# Patient Record
Sex: MALE | Race: WHITE | NOT HISPANIC OR LATINO
[De-identification: names, ages, dates, MRNs, and addresses within clinical notes are randomized per-mention and may not be internally consistent; named-entity substitution may affect disease eponyms.]

---

## 2017-01-05 ENCOUNTER — APPOINTMENT (OUTPATIENT)
Dept: NUCLEAR MEDICINE | Facility: IMAGING CENTER | Age: 77
End: 2017-01-05

## 2017-01-09 ENCOUNTER — APPOINTMENT (OUTPATIENT)
Dept: DERMATOLOGY | Facility: CLINIC | Age: 77
End: 2017-01-09

## 2017-01-24 ENCOUNTER — APPOINTMENT (OUTPATIENT)
Dept: DERMATOLOGY | Facility: CLINIC | Age: 77
End: 2017-01-24

## 2017-03-10 ENCOUNTER — EMERGENCY (EMERGENCY)
Facility: HOSPITAL | Age: 77
LOS: 1 days | Discharge: HOME | End: 2017-03-10
Attending: EMERGENCY MEDICINE
Payer: MEDICARE

## 2017-03-10 VITALS
WEIGHT: 182.1 LBS | TEMPERATURE: 98 F | HEIGHT: 66 IN | HEART RATE: 87 BPM | SYSTOLIC BLOOD PRESSURE: 128 MMHG | RESPIRATION RATE: 16 BRPM | OXYGEN SATURATION: 99 % | DIASTOLIC BLOOD PRESSURE: 77 MMHG

## 2017-03-10 DIAGNOSIS — Z98.890 OTHER SPECIFIED POSTPROCEDURAL STATES: Chronic | ICD-10-CM

## 2017-03-10 DIAGNOSIS — R42 DIZZINESS AND GIDDINESS: ICD-10-CM

## 2017-03-10 DIAGNOSIS — Z98.890 OTHER SPECIFIED POSTPROCEDURAL STATES: ICD-10-CM

## 2017-03-10 DIAGNOSIS — F32.9 MAJOR DEPRESSIVE DISORDER, SINGLE EPISODE, UNSPECIFIED: ICD-10-CM

## 2017-03-10 LAB
ALBUMIN SERPL ELPH-MCNC: 4.1 G/DL — SIGNIFICANT CHANGE UP (ref 3.3–5.2)
ALP SERPL-CCNC: 53 U/L — SIGNIFICANT CHANGE UP (ref 40–120)
ALT FLD-CCNC: 20 U/L — SIGNIFICANT CHANGE UP
ANION GAP SERPL CALC-SCNC: 10 MMOL/L — SIGNIFICANT CHANGE UP (ref 5–17)
AST SERPL-CCNC: 25 U/L — SIGNIFICANT CHANGE UP
BASOPHILS # BLD AUTO: 0 K/UL — SIGNIFICANT CHANGE UP (ref 0–0.2)
BASOPHILS NFR BLD AUTO: 0.2 % — SIGNIFICANT CHANGE UP (ref 0–2)
BILIRUB SERPL-MCNC: 0.7 MG/DL — SIGNIFICANT CHANGE UP (ref 0.4–2)
BUN SERPL-MCNC: 20 MG/DL — SIGNIFICANT CHANGE UP (ref 8–20)
CALCIUM SERPL-MCNC: 9.1 MG/DL — SIGNIFICANT CHANGE UP (ref 8.6–10.2)
CHLORIDE SERPL-SCNC: 100 MMOL/L — SIGNIFICANT CHANGE UP (ref 98–107)
CK MB CFR SERPL CALC: 3.4 NG/ML — SIGNIFICANT CHANGE UP (ref 0–6.7)
CK SERPL-CCNC: 171 U/L — SIGNIFICANT CHANGE UP (ref 30–200)
CO2 SERPL-SCNC: 30 MMOL/L — HIGH (ref 22–29)
CREAT SERPL-MCNC: 1.02 MG/DL — SIGNIFICANT CHANGE UP (ref 0.5–1.3)
EOSINOPHIL # BLD AUTO: 0 K/UL — SIGNIFICANT CHANGE UP (ref 0–0.5)
EOSINOPHIL NFR BLD AUTO: 0.9 % — SIGNIFICANT CHANGE UP (ref 0–6)
GLUCOSE SERPL-MCNC: 89 MG/DL — SIGNIFICANT CHANGE UP (ref 70–115)
HCT VFR BLD CALC: 41.5 % — LOW (ref 42–52)
HGB BLD-MCNC: 14.1 G/DL — SIGNIFICANT CHANGE UP (ref 14–18)
LYMPHOCYTES # BLD AUTO: 1.8 K/UL — SIGNIFICANT CHANGE UP (ref 1–4.8)
LYMPHOCYTES # BLD AUTO: 33.8 % — SIGNIFICANT CHANGE UP (ref 20–55)
MCHC RBC-ENTMCNC: 31.1 PG — HIGH (ref 27–31)
MCHC RBC-ENTMCNC: 34 G/DL — SIGNIFICANT CHANGE UP (ref 32–36)
MCV RBC AUTO: 91.6 FL — SIGNIFICANT CHANGE UP (ref 80–94)
MONOCYTES # BLD AUTO: 0.5 K/UL — SIGNIFICANT CHANGE UP (ref 0–0.8)
MONOCYTES NFR BLD AUTO: 8.7 % — SIGNIFICANT CHANGE UP (ref 3–10)
NEUTROPHILS # BLD AUTO: 3 K/UL — SIGNIFICANT CHANGE UP (ref 1.8–8)
NEUTROPHILS NFR BLD AUTO: 56.2 % — SIGNIFICANT CHANGE UP (ref 37–73)
PLATELET # BLD AUTO: 217 K/UL — SIGNIFICANT CHANGE UP (ref 150–400)
POTASSIUM SERPL-MCNC: 4.3 MMOL/L — SIGNIFICANT CHANGE UP (ref 3.5–5.3)
POTASSIUM SERPL-SCNC: 4.3 MMOL/L — SIGNIFICANT CHANGE UP (ref 3.5–5.3)
PROT SERPL-MCNC: 7 G/DL — SIGNIFICANT CHANGE UP (ref 6.6–8.7)
RBC # BLD: 4.53 M/UL — LOW (ref 4.6–6.2)
RBC # FLD: 13.1 % — SIGNIFICANT CHANGE UP (ref 11–15.6)
SODIUM SERPL-SCNC: 140 MMOL/L — SIGNIFICANT CHANGE UP (ref 135–145)
TROPONIN T SERPL-MCNC: <0.01 NG/ML — SIGNIFICANT CHANGE UP (ref 0–0.06)
WBC # BLD: 5.4 K/UL — SIGNIFICANT CHANGE UP (ref 4.8–10.8)
WBC # FLD AUTO: 5.4 K/UL — SIGNIFICANT CHANGE UP (ref 4.8–10.8)

## 2017-03-10 PROCEDURE — 93005 ELECTROCARDIOGRAM TRACING: CPT

## 2017-03-10 PROCEDURE — 70450 CT HEAD/BRAIN W/O DYE: CPT

## 2017-03-10 PROCEDURE — 99285 EMERGENCY DEPT VISIT HI MDM: CPT

## 2017-03-10 PROCEDURE — 99284 EMERGENCY DEPT VISIT MOD MDM: CPT | Mod: 25

## 2017-03-10 PROCEDURE — 93010 ELECTROCARDIOGRAM REPORT: CPT

## 2017-03-10 PROCEDURE — 84484 ASSAY OF TROPONIN QUANT: CPT

## 2017-03-10 PROCEDURE — 70450 CT HEAD/BRAIN W/O DYE: CPT | Mod: 26

## 2017-03-10 PROCEDURE — 82550 ASSAY OF CK (CPK): CPT

## 2017-03-10 PROCEDURE — 85027 COMPLETE CBC AUTOMATED: CPT

## 2017-03-10 PROCEDURE — 80053 COMPREHEN METABOLIC PANEL: CPT

## 2017-03-10 PROCEDURE — 82553 CREATINE MB FRACTION: CPT

## 2017-03-10 RX ORDER — METOCLOPRAMIDE HCL 10 MG
10 TABLET ORAL ONCE
Qty: 0 | Refills: 0 | Status: DISCONTINUED | OUTPATIENT
Start: 2017-03-10 | End: 2017-03-10

## 2017-03-10 RX ORDER — MECLIZINE HCL 12.5 MG
25 TABLET ORAL ONCE
Qty: 0 | Refills: 0 | Status: DISCONTINUED | OUTPATIENT
Start: 2017-03-10 | End: 2017-03-10

## 2017-03-10 RX ORDER — SODIUM CHLORIDE 9 MG/ML
500 INJECTION INTRAMUSCULAR; INTRAVENOUS; SUBCUTANEOUS ONCE
Qty: 0 | Refills: 0 | Status: COMPLETED | OUTPATIENT
Start: 2017-03-10 | End: 2017-03-10

## 2017-03-10 RX ADMIN — SODIUM CHLORIDE 500 MILLILITER(S): 9 INJECTION INTRAMUSCULAR; INTRAVENOUS; SUBCUTANEOUS at 13:25

## 2017-03-10 NOTE — ED ADULT NURSE NOTE - OBJECTIVE STATEMENT
pt states that he was light headed this am. pt denies chest pain no n/v. pt had CO checked in his house no CO detected.

## 2017-03-10 NOTE — ED PROVIDER NOTE - NEUROLOGICAL, MLM
Alert and oriented, no focal deficits, no motor or sensory deficits. Normal Finger- nose/ heel shin. No drift. CN II-XII intact.

## 2017-03-10 NOTE — ED PROVIDER NOTE - OBJECTIVE STATEMENT
76M  with h/o depression, BPH pw several weeks of lightheadedness mostly when patient is at home. Pt today became concerned ; called the [employer] company/ fire dept to check for CO and they came and cleared his home.  He denies palpitations/ dizziness/difficulty walking/ imbalance/ chest pain/ SOB.  States he had a full cardiac evaluation including echo and 24h Holter w/ Dr. Banuelos 2 weeks ago for similar symptoms.  States that he thinks he is depressed 2/2 his wife passing away 2 years ago in march and is lonely when he is at home alone.  States he is taking some medicine for depression and that it isn't severe, but it's baseline underlying. he states he attended grief counseling and has been meeting with the group of women who he met there for companionship , however 2 of them are interested in him and call him constantly but he is not interested. He states he feels a lot of pressure to maintain the relationship but thinks that it is causing him stress and he would like to cut it off.  He has a desire to travel the world and travel to see friends and believes that would do him good. Denies HI/ SI States he didn't really want to come to the hospital but 911 insisted and brought him here for evaluation. Pt adds that he exercises at least 2 hours daily and never feels SOB/ dizzy when he exercises. Eating and drinking well but not sleeping well at night

## 2017-03-10 NOTE — ED PROVIDER NOTE - MEDICAL DECISION MAKING DETAILS
76yoM pw mild lightheadedness he notices mostly when at home by himself in the setting of feeling depressed. Neuro intact and recent cardiac evaluation - will do screening blood work/ repeat EKG,/ screening CTh and likely rcommend f/u with him PMD/ psychiatrist.

## 2017-03-10 NOTE — ED PROVIDER NOTE - PROGRESS NOTE DETAILS
I reassessed patient. He is well appearing. Has been ambulatory around ED without ataxia/ assistance. I gave him a copy of his results and recommended that he call his Drs. for f/u which he states he will. Gave verbal discharge instructions and I removed patient IV.

## 2017-04-20 ENCOUNTER — APPOINTMENT (OUTPATIENT)
Dept: GASTROENTEROLOGY | Facility: CLINIC | Age: 77
End: 2017-04-20

## 2017-04-20 VITALS
DIASTOLIC BLOOD PRESSURE: 73 MMHG | WEIGHT: 156 LBS | SYSTOLIC BLOOD PRESSURE: 127 MMHG | RESPIRATION RATE: 12 BRPM | HEART RATE: 52 BPM | BODY MASS INDEX: 23.64 KG/M2 | HEIGHT: 68 IN

## 2017-05-22 ENCOUNTER — OUTPATIENT (OUTPATIENT)
Dept: OUTPATIENT SERVICES | Facility: HOSPITAL | Age: 77
LOS: 1 days | End: 2017-05-22
Payer: MEDICARE

## 2017-05-22 ENCOUNTER — APPOINTMENT (OUTPATIENT)
Dept: GASTROENTEROLOGY | Facility: GI CENTER | Age: 77
End: 2017-05-22

## 2017-05-22 DIAGNOSIS — Z12.11 ENCOUNTER FOR SCREENING FOR MALIGNANT NEOPLASM OF COLON: ICD-10-CM

## 2017-05-22 DIAGNOSIS — Z98.890 OTHER SPECIFIED POSTPROCEDURAL STATES: Chronic | ICD-10-CM

## 2017-05-22 PROCEDURE — G0105: CPT

## 2017-07-27 ENCOUNTER — APPOINTMENT (OUTPATIENT)
Dept: DERMATOLOGY | Facility: CLINIC | Age: 77
End: 2017-07-27
Payer: MEDICARE

## 2017-07-27 PROCEDURE — 99214 OFFICE O/P EST MOD 30 MIN: CPT

## 2017-08-31 ENCOUNTER — APPOINTMENT (OUTPATIENT)
Dept: DERMATOLOGY | Facility: CLINIC | Age: 77
End: 2017-08-31
Payer: MEDICARE

## 2017-08-31 PROCEDURE — 17003 DESTRUCT PREMALG LES 2-14: CPT

## 2017-08-31 PROCEDURE — 99213 OFFICE O/P EST LOW 20 MIN: CPT | Mod: 25

## 2017-08-31 PROCEDURE — 17000 DESTRUCT PREMALG LESION: CPT

## 2017-10-03 ENCOUNTER — APPOINTMENT (OUTPATIENT)
Dept: DERMATOLOGY | Facility: CLINIC | Age: 77
End: 2017-10-03
Payer: MEDICARE

## 2017-10-03 PROCEDURE — 99212 OFFICE O/P EST SF 10 MIN: CPT

## 2017-12-07 ENCOUNTER — APPOINTMENT (OUTPATIENT)
Dept: NEUROLOGY | Facility: CLINIC | Age: 77
End: 2017-12-07
Payer: MEDICARE

## 2017-12-07 VITALS
HEART RATE: 52 BPM | WEIGHT: 152 LBS | DIASTOLIC BLOOD PRESSURE: 67 MMHG | BODY MASS INDEX: 23.04 KG/M2 | HEIGHT: 68 IN | SYSTOLIC BLOOD PRESSURE: 116 MMHG

## 2017-12-07 DIAGNOSIS — G31.84 MILD COGNITIVE IMPAIRMENT, SO STATED: ICD-10-CM

## 2017-12-07 PROCEDURE — 99214 OFFICE O/P EST MOD 30 MIN: CPT

## 2017-12-07 RX ORDER — SODIUM SULFATE, POTASSIUM SULFATE, MAGNESIUM SULFATE 17.5; 3.13; 1.6 G/ML; G/ML; G/ML
17.5-3.13-1.6 SOLUTION, CONCENTRATE ORAL
Qty: 2 | Refills: 0 | Status: DISCONTINUED | COMMUNITY
Start: 2017-04-20 | End: 2017-12-07

## 2017-12-07 RX ORDER — TAMSULOSIN HYDROCHLORIDE 0.4 MG/1
0.4 CAPSULE ORAL
Refills: 0 | Status: DISCONTINUED | COMMUNITY
End: 2017-12-07

## 2018-01-22 ENCOUNTER — APPOINTMENT (OUTPATIENT)
Dept: DERMATOLOGY | Facility: CLINIC | Age: 78
End: 2018-01-22
Payer: MEDICARE

## 2018-01-22 ENCOUNTER — APPOINTMENT (OUTPATIENT)
Dept: DERMATOLOGY | Facility: CLINIC | Age: 78
End: 2018-01-22

## 2018-01-22 PROCEDURE — 99213 OFFICE O/P EST LOW 20 MIN: CPT | Mod: 25

## 2018-01-22 PROCEDURE — 17110 DESTRUCTION B9 LES UP TO 14: CPT

## 2018-02-05 ENCOUNTER — APPOINTMENT (OUTPATIENT)
Dept: DERMATOLOGY | Facility: CLINIC | Age: 78
End: 2018-02-05

## 2018-03-01 ENCOUNTER — APPOINTMENT (OUTPATIENT)
Dept: DERMATOLOGY | Facility: CLINIC | Age: 78
End: 2018-03-01

## 2018-04-12 ENCOUNTER — APPOINTMENT (OUTPATIENT)
Dept: NEUROLOGY | Facility: CLINIC | Age: 78
End: 2018-04-12
Payer: MEDICARE

## 2018-04-12 VITALS
DIASTOLIC BLOOD PRESSURE: 69 MMHG | WEIGHT: 157 LBS | SYSTOLIC BLOOD PRESSURE: 116 MMHG | BODY MASS INDEX: 23.79 KG/M2 | HEART RATE: 54 BPM | HEIGHT: 68 IN

## 2018-04-12 DIAGNOSIS — F02.80 ALZHEIMER'S DISEASE WITH LATE ONSET: ICD-10-CM

## 2018-04-12 DIAGNOSIS — R32 UNSPECIFIED URINARY INCONTINENCE: ICD-10-CM

## 2018-04-12 DIAGNOSIS — N52.9 MALE ERECTILE DYSFUNCTION, UNSPECIFIED: ICD-10-CM

## 2018-04-12 DIAGNOSIS — R35.0 FREQUENCY OF MICTURITION: ICD-10-CM

## 2018-04-12 DIAGNOSIS — G30.1 ALZHEIMER'S DISEASE WITH LATE ONSET: ICD-10-CM

## 2018-04-12 PROCEDURE — 99214 OFFICE O/P EST MOD 30 MIN: CPT

## 2018-04-12 RX ORDER — RIVASTIGMINE 4.6 MG/24H
4.6 PATCH, EXTENDED RELEASE TRANSDERMAL
Qty: 30 | Refills: 0 | Status: COMPLETED | COMMUNITY
Start: 2017-12-07 | End: 2018-01-06

## 2018-05-22 ENCOUNTER — MEDICATION RENEWAL (OUTPATIENT)
Age: 78
End: 2018-05-22

## 2018-07-23 ENCOUNTER — RESULT REVIEW (OUTPATIENT)
Age: 78
End: 2018-07-23

## 2018-07-23 ENCOUNTER — APPOINTMENT (OUTPATIENT)
Dept: DERMATOLOGY | Facility: CLINIC | Age: 78
End: 2018-07-23
Payer: MEDICARE

## 2018-07-23 PROBLEM — F32.9 MAJOR DEPRESSIVE DISORDER, SINGLE EPISODE, UNSPECIFIED: Chronic | Status: ACTIVE | Noted: 2017-03-10

## 2018-07-23 PROBLEM — R41.3 OTHER AMNESIA: Chronic | Status: ACTIVE | Noted: 2017-03-10

## 2018-07-23 PROCEDURE — 99214 OFFICE O/P EST MOD 30 MIN: CPT | Mod: 25

## 2018-07-23 PROCEDURE — 17110 DESTRUCTION B9 LES UP TO 14: CPT

## 2018-09-10 ENCOUNTER — RX RENEWAL (OUTPATIENT)
Age: 78
End: 2018-09-10

## 2018-09-20 ENCOUNTER — MEDICATION RENEWAL (OUTPATIENT)
Age: 78
End: 2018-09-20

## 2018-09-20 RX ORDER — RIVASTIGMINE 13.3 MG/24H
13.3 PATCH, EXTENDED RELEASE TRANSDERMAL DAILY
Qty: 30 | Refills: 2 | Status: ACTIVE | COMMUNITY
Start: 2017-12-07 | End: 1900-01-01

## 2018-10-11 ENCOUNTER — APPOINTMENT (OUTPATIENT)
Dept: NEUROLOGY | Facility: CLINIC | Age: 78
End: 2018-10-11

## 2019-01-24 ENCOUNTER — APPOINTMENT (OUTPATIENT)
Dept: DERMATOLOGY | Facility: CLINIC | Age: 79
End: 2019-01-24

## 2020-05-19 NOTE — ED PROVIDER NOTE - DISCHARGE DATE
Once results come back patient would like us to send her a copy to her Home address on file   
10-Mar-2017

## 2020-10-22 ENCOUNTER — APPOINTMENT (OUTPATIENT)
Dept: DERMATOLOGY | Facility: CLINIC | Age: 80
End: 2020-10-22
Payer: MEDICARE

## 2020-10-22 PROCEDURE — 99214 OFFICE O/P EST MOD 30 MIN: CPT

## 2022-07-11 ENCOUNTER — EMERGENCY (EMERGENCY)
Facility: HOSPITAL | Age: 82
LOS: 1 days | Discharge: HOME | End: 2022-07-11
Attending: EMERGENCY MEDICINE
Payer: MEDICARE

## 2022-07-11 VITALS
HEIGHT: 66 IN | HEART RATE: 77 BPM | WEIGHT: 164.91 LBS | RESPIRATION RATE: 18 BRPM | SYSTOLIC BLOOD PRESSURE: 117 MMHG | DIASTOLIC BLOOD PRESSURE: 75 MMHG | TEMPERATURE: 98 F | OXYGEN SATURATION: 95 %

## 2022-07-11 DIAGNOSIS — Z98.890 OTHER SPECIFIED POSTPROCEDURAL STATES: Chronic | ICD-10-CM

## 2022-07-11 LAB
ALBUMIN SERPL ELPH-MCNC: 3.6 G/DL — SIGNIFICANT CHANGE UP (ref 3.3–5.2)
ALP SERPL-CCNC: 40 U/L — SIGNIFICANT CHANGE UP (ref 40–120)
ALT FLD-CCNC: 34 U/L — SIGNIFICANT CHANGE UP
ANION GAP SERPL CALC-SCNC: 12 MMOL/L — SIGNIFICANT CHANGE UP (ref 5–17)
APPEARANCE UR: ABNORMAL
AST SERPL-CCNC: 70 U/L — HIGH
BACTERIA # UR AUTO: ABNORMAL
BASOPHILS # BLD AUTO: 0 K/UL — SIGNIFICANT CHANGE UP (ref 0–0.2)
BASOPHILS NFR BLD AUTO: 0 % — SIGNIFICANT CHANGE UP (ref 0–2)
BILIRUB SERPL-MCNC: 0.9 MG/DL — SIGNIFICANT CHANGE UP (ref 0.4–2)
BILIRUB UR-MCNC: NEGATIVE — SIGNIFICANT CHANGE UP
BUN SERPL-MCNC: 25.6 MG/DL — HIGH (ref 8–20)
CALCIUM SERPL-MCNC: 8.1 MG/DL — LOW (ref 8.6–10.2)
CHLORIDE SERPL-SCNC: 95 MMOL/L — LOW (ref 98–107)
CO2 SERPL-SCNC: 23 MMOL/L — SIGNIFICANT CHANGE UP (ref 22–29)
COLOR SPEC: ABNORMAL
CREAT SERPL-MCNC: 0.86 MG/DL — SIGNIFICANT CHANGE UP (ref 0.5–1.3)
DIFF PNL FLD: ABNORMAL
EGFR: 87 ML/MIN/1.73M2 — SIGNIFICANT CHANGE UP
EOSINOPHIL # BLD AUTO: 0 K/UL — SIGNIFICANT CHANGE UP (ref 0–0.5)
EOSINOPHIL NFR BLD AUTO: 0 % — SIGNIFICANT CHANGE UP (ref 0–6)
EPI CELLS # UR: SIGNIFICANT CHANGE UP
GIANT PLATELETS BLD QL SMEAR: PRESENT — SIGNIFICANT CHANGE UP
GLUCOSE SERPL-MCNC: 101 MG/DL — HIGH (ref 70–99)
GLUCOSE UR QL: NEGATIVE MG/DL — SIGNIFICANT CHANGE UP
HCT VFR BLD CALC: 43.1 % — SIGNIFICANT CHANGE UP (ref 39–50)
HGB BLD-MCNC: 15 G/DL — SIGNIFICANT CHANGE UP (ref 13–17)
KETONES UR-MCNC: ABNORMAL
LEUKOCYTE ESTERASE UR-ACNC: NEGATIVE — SIGNIFICANT CHANGE UP
LYMPHOCYTES # BLD AUTO: 1.13 K/UL — SIGNIFICANT CHANGE UP (ref 1–3.3)
LYMPHOCYTES # BLD AUTO: 30.3 % — SIGNIFICANT CHANGE UP (ref 13–44)
MANUAL SMEAR VERIFICATION: SIGNIFICANT CHANGE UP
MCHC RBC-ENTMCNC: 30.8 PG — SIGNIFICANT CHANGE UP (ref 27–34)
MCHC RBC-ENTMCNC: 34.8 GM/DL — SIGNIFICANT CHANGE UP (ref 32–36)
MCV RBC AUTO: 88.5 FL — SIGNIFICANT CHANGE UP (ref 80–100)
MONOCYTES # BLD AUTO: 0.52 K/UL — SIGNIFICANT CHANGE UP (ref 0–0.9)
MONOCYTES NFR BLD AUTO: 13.8 % — SIGNIFICANT CHANGE UP (ref 2–14)
NEUTROPHILS # BLD AUTO: 1.85 K/UL — SIGNIFICANT CHANGE UP (ref 1.8–7.4)
NEUTROPHILS NFR BLD AUTO: 48.6 % — SIGNIFICANT CHANGE UP (ref 43–77)
NEUTS BAND # BLD: 0.9 % — SIGNIFICANT CHANGE UP (ref 0–8)
NITRITE UR-MCNC: NEGATIVE — SIGNIFICANT CHANGE UP
PH UR: 6 — SIGNIFICANT CHANGE UP (ref 5–8)
PLAT MORPH BLD: NORMAL — SIGNIFICANT CHANGE UP
PLATELET # BLD AUTO: 104 K/UL — LOW (ref 150–400)
POTASSIUM SERPL-MCNC: 4.4 MMOL/L — SIGNIFICANT CHANGE UP (ref 3.5–5.3)
POTASSIUM SERPL-SCNC: 4.4 MMOL/L — SIGNIFICANT CHANGE UP (ref 3.5–5.3)
PROT SERPL-MCNC: 6.3 G/DL — LOW (ref 6.6–8.7)
PROT UR-MCNC: 15
RBC # BLD: 4.87 M/UL — SIGNIFICANT CHANGE UP (ref 4.2–5.8)
RBC # FLD: 13.3 % — SIGNIFICANT CHANGE UP (ref 10.3–14.5)
RBC BLD AUTO: NORMAL — SIGNIFICANT CHANGE UP
RBC CASTS # UR COMP ASSIST: ABNORMAL /HPF (ref 0–4)
SODIUM SERPL-SCNC: 130 MMOL/L — LOW (ref 135–145)
SP GR SPEC: 1.02 — SIGNIFICANT CHANGE UP (ref 1.01–1.02)
TROPONIN T SERPL-MCNC: <0.01 NG/ML — SIGNIFICANT CHANGE UP (ref 0–0.06)
UROBILINOGEN FLD QL: 4 MG/DL
VARIANT LYMPHS # BLD: 6.4 % — HIGH (ref 0–6)
WBC # BLD: 3.74 K/UL — LOW (ref 3.8–10.5)
WBC # FLD AUTO: 3.74 K/UL — LOW (ref 3.8–10.5)
WBC UR QL: SIGNIFICANT CHANGE UP /HPF (ref 0–5)

## 2022-07-11 PROCEDURE — 93880 EXTRACRANIAL BILAT STUDY: CPT | Mod: 26

## 2022-07-11 PROCEDURE — 93010 ELECTROCARDIOGRAM REPORT: CPT

## 2022-07-11 PROCEDURE — 70450 CT HEAD/BRAIN W/O DYE: CPT | Mod: 26,MA

## 2022-07-11 PROCEDURE — 99220: CPT | Mod: FS

## 2022-07-11 PROCEDURE — 70551 MRI BRAIN STEM W/O DYE: CPT | Mod: 26,MA

## 2022-07-11 PROCEDURE — 71045 X-RAY EXAM CHEST 1 VIEW: CPT | Mod: 26

## 2022-07-11 RX ORDER — SODIUM CHLORIDE 9 MG/ML
500 INJECTION INTRAMUSCULAR; INTRAVENOUS; SUBCUTANEOUS ONCE
Refills: 0 | Status: COMPLETED | OUTPATIENT
Start: 2022-07-11 | End: 2022-07-11

## 2022-07-11 RX ORDER — TAMSULOSIN HYDROCHLORIDE 0.4 MG/1
0.4 CAPSULE ORAL AT BEDTIME
Refills: 0 | Status: DISCONTINUED | OUTPATIENT
Start: 2022-07-11 | End: 2022-07-15

## 2022-07-11 RX ORDER — MEMANTINE HYDROCHLORIDE 10 MG/1
5 TABLET ORAL DAILY
Refills: 0 | Status: DISCONTINUED | OUTPATIENT
Start: 2022-07-11 | End: 2022-07-15

## 2022-07-11 RX ORDER — DONEPEZIL HYDROCHLORIDE 10 MG/1
10 TABLET, FILM COATED ORAL AT BEDTIME
Refills: 0 | Status: DISCONTINUED | OUTPATIENT
Start: 2022-07-11 | End: 2022-07-15

## 2022-07-11 RX ORDER — HALOPERIDOL DECANOATE 100 MG/ML
5 INJECTION INTRAMUSCULAR ONCE
Refills: 0 | Status: COMPLETED | OUTPATIENT
Start: 2022-07-11 | End: 2022-07-11

## 2022-07-11 RX ORDER — ASPIRIN/CALCIUM CARB/MAGNESIUM 324 MG
81 TABLET ORAL ONCE
Refills: 0 | Status: COMPLETED | OUTPATIENT
Start: 2022-07-11 | End: 2022-07-11

## 2022-07-11 RX ADMIN — SODIUM CHLORIDE 500 MILLILITER(S): 9 INJECTION INTRAMUSCULAR; INTRAVENOUS; SUBCUTANEOUS at 12:45

## 2022-07-11 RX ADMIN — Medication 81 MILLIGRAM(S): at 17:55

## 2022-07-11 RX ADMIN — TAMSULOSIN HYDROCHLORIDE 0.4 MILLIGRAM(S): 0.4 CAPSULE ORAL at 15:06

## 2022-07-11 RX ADMIN — DONEPEZIL HYDROCHLORIDE 10 MILLIGRAM(S): 10 TABLET, FILM COATED ORAL at 15:06

## 2022-07-11 RX ADMIN — Medication 0.5 MILLIGRAM(S): at 22:19

## 2022-07-11 RX ADMIN — MEMANTINE HYDROCHLORIDE 5 MILLIGRAM(S): 10 TABLET ORAL at 15:05

## 2022-07-11 RX ADMIN — SODIUM CHLORIDE 500 MILLILITER(S): 9 INJECTION INTRAMUSCULAR; INTRAVENOUS; SUBCUTANEOUS at 13:45

## 2022-07-11 NOTE — ED ADULT TRIAGE NOTE - CHIEF COMPLAINT QUOTE
pt a+ox3, BIBA from home c/o worsening dizziness and weakness for "quite some time". hx of memory impairment.

## 2022-07-11 NOTE — ED CDU PROVIDER INITIAL DAY NOTE - PROGRESS NOTE DETAILS
Received during sign out. Resting comfortably, MRI results noted. Pending PT evaluation. Received call from RN, patient got out of bed and placed self on floor. Did NOT fall. Placed in Trendelenburg. Haldol, enhanced supervision, and 1:1 ordered for patient and staff safety. No 1:1 available, escalated to supervision.

## 2022-07-11 NOTE — ED ADULT NURSE NOTE - CHPI ED NUR SYMPTOMS NEG
no chills/no decreased eating/drinking/no dizziness/no fever/no nausea/no pain/no tingling/no vomiting/no weakness no dizziness/no fever/no nausea/no numbness/no vomiting/no weakness

## 2022-07-11 NOTE — ED ADULT NURSE NOTE - OBJECTIVE STATEMENT
Pt is A&Ox2 to her name and location but is confused baseline per family at the bedside.  Pt c/o increaed weakness.  Pt has consticted pupils gcs 14 NIH 0 Pt is A&Ox2 to his name and location but is confused baseline per family at the bedside.  Pt c/o increased weakness for a couple of days.  Pt has consticted pupils.  Per son at bedside unknown exact reason why he was taken to ED upon calling pt significat  other at home she reports increased weakness

## 2022-07-11 NOTE — ED CDU PROVIDER INITIAL DAY NOTE - ATTENDING APP SHARED VISIT CONTRIBUTION OF CARE
I, Michael Reagan, have personally performed a face to face diagnostic evaluation on this patient. I have reviewed the RANDI note and agree with the history, exam and plan of care, except as noted.    82 yo M p/w dizziness and weakness. CT head unremarkable. patient placed in Obs for MR head and PT.

## 2022-07-11 NOTE — ED PROVIDER NOTE - OBJECTIVE STATEMENT
82 yo male hx of BPH and memory impairment; p/w "my legs gave out," and I feel "weak." Patient states he was at home with his partner approx 4-5 days ago, stood up and felt like his legs weren't working all of a sudden; patient states hehad to lay back down because he couldn't walk; patient states he was laying in bed for 2 days using a pot as a bathroom; afer 3 days, started to feel better, now he can get around close to his baseline, states usually independent and not reliant on cane/walker; states his gait is still a bit slow and "off' but better than before

## 2022-07-11 NOTE — ED CDU PROVIDER INITIAL DAY NOTE - OBJECTIVE STATEMENT
80 yo male hx of BPH and memory impairment; p/w "my legs gave out," and I feel "weak." Patient states he was at home with his partner approx 4-5 days ago, stood up and felt like his legs weren't working all of a sudden; patient states he had to lay back down because he couldn't walk; patient states he was laying in bed for 2 days using a pot as a bathroom; after 3 days, started to feel better, now he can get around close to his baseline, states usually independent and not reliant on cane/walker; states his gait is still a bit slow and "off' but better than before. Son at bedside.

## 2022-07-11 NOTE — ED CDU PROVIDER INITIAL DAY NOTE - NS ED ATTENDING STATEMENT MOD
This was a shared visit with the RANDI. I reviewed and verified the documentation and independently performed the documented:

## 2022-07-11 NOTE — ED CDU PROVIDER INITIAL DAY NOTE - MEDICAL DECISION MAKING DETAILS
Pt is a 81y M with PMH of BPH and memory impairment presenting for feeling like his legs were giving out and over all not being able to walk. Pt denies "room spinning sensation" Placed in obs for TIA workup.

## 2022-07-11 NOTE — ED ADULT NURSE REASSESSMENT NOTE - NS ED NURSE REASSESS COMMENT FT1
Pt is resting in bed comfortably at this time, no apparent distress noted at this time. pt remains NSR on cardiac monitor. pt awaiting sono and MRI.  pt safety maintained. Pt denies any complaints at this time.

## 2022-07-11 NOTE — ED ADULT NURSE REASSESSMENT NOTE - NS ED NURSE REASSESS COMMENT FT1
Pt is resting in bed comfortably at this time, no apparent distress noted at this time. pt safety maintained. Pt denies any complaints at this time. pt remains NSR on cardiac monitor. Pt daughter at bedside. pt awaiting ct results.

## 2022-07-12 VITALS
TEMPERATURE: 98 F | DIASTOLIC BLOOD PRESSURE: 63 MMHG | HEART RATE: 64 BPM | OXYGEN SATURATION: 96 % | RESPIRATION RATE: 18 BRPM | SYSTOLIC BLOOD PRESSURE: 113 MMHG

## 2022-07-12 PROCEDURE — 84484 ASSAY OF TROPONIN QUANT: CPT

## 2022-07-12 PROCEDURE — 70450 CT HEAD/BRAIN W/O DYE: CPT | Mod: MA

## 2022-07-12 PROCEDURE — 99217: CPT

## 2022-07-12 PROCEDURE — 96374 THER/PROPH/DIAG INJ IV PUSH: CPT

## 2022-07-12 PROCEDURE — 80053 COMPREHEN METABOLIC PANEL: CPT

## 2022-07-12 PROCEDURE — 93005 ELECTROCARDIOGRAM TRACING: CPT

## 2022-07-12 PROCEDURE — 93880 EXTRACRANIAL BILAT STUDY: CPT

## 2022-07-12 PROCEDURE — 85025 COMPLETE CBC W/AUTO DIFF WBC: CPT

## 2022-07-12 PROCEDURE — 70551 MRI BRAIN STEM W/O DYE: CPT | Mod: MA

## 2022-07-12 PROCEDURE — 36415 COLL VENOUS BLD VENIPUNCTURE: CPT

## 2022-07-12 PROCEDURE — 81001 URINALYSIS AUTO W/SCOPE: CPT

## 2022-07-12 PROCEDURE — G0378: CPT

## 2022-07-12 PROCEDURE — 71045 X-RAY EXAM CHEST 1 VIEW: CPT

## 2022-07-12 PROCEDURE — 87086 URINE CULTURE/COLONY COUNT: CPT

## 2022-07-12 PROCEDURE — 99284 EMERGENCY DEPT VISIT MOD MDM: CPT | Mod: 25

## 2022-07-12 PROCEDURE — 96361 HYDRATE IV INFUSION ADD-ON: CPT

## 2022-07-12 RX ADMIN — HALOPERIDOL DECANOATE 5 MILLIGRAM(S): 100 INJECTION INTRAMUSCULAR at 01:08

## 2022-07-12 NOTE — ED CDU PROVIDER DISPOSITION NOTE - CLINICAL COURSE
80 yo male hx of dementia and BPH p-resenting to the Er with frequent falls at home palced in obs for PT eval and MRI. overnight placed on 1;1 due to fall risks and multiple attempts to get out of bed. pt seen by PT in AM with recs for homecare/pt at home. dc back to [facility] sn living with rolling walker

## 2022-07-12 NOTE — ED ADULT NURSE REASSESSMENT NOTE - NS ED NURSE REASSESS COMMENT FT1
Pt resting comfortable in bed, 1:1 observation continues, safety maintained, will endorse to oncoming shift

## 2022-07-12 NOTE — PHYSICAL THERAPY INITIAL EVALUATION ADULT - ADDITIONAL COMMENTS
Pt is a poor historian, reports living in an apartment, 3rd floor, with a "women". Reports amb without devices and being independent with ADLs and self care.

## 2022-07-12 NOTE — ED CDU PROVIDER DISPOSITION NOTE - PATIENT PORTAL LINK FT
You can access the FollowMyHealth Patient Portal offered by [facility] by registering at the following website: [email or web address]. By joining [employer]’s FollowMyHealth portal, you will also be able to view your health information using other applications (apps) compatible with our system.

## 2022-07-12 NOTE — ED CDU PROVIDER DISPOSITION NOTE - ATTENDING CONTRIBUTION TO CARE
I, Michael Reagan, have personally performed a face to face diagnostic evaluation on this patient. I have reviewed the RANDI note and agree with the history, exam and plan of care, except as noted.    82 yo M p/w dizziness and weakness. CT head unremarkable. MR without acute stroke. patient walked with PT. patient comfortable going home with rolling walker and home PT.

## 2022-07-12 NOTE — PROVIDER CONTACT NOTE (OTHER) - ASSESSMENT
Pt with 0/10 pain before, during or after RX.  Pt will benefit from PT to maximize functional independence. Will continue to follow.  Pt left supine in bed in no apparent distress and call bell within reach, and 1:1 supervision present. Nurse aware

## 2022-07-12 NOTE — PROVIDER CONTACT NOTE (OTHER) - ACTION/TREATMENT ORDERED:
PT Goals( to achieve in 2 weeks);Pt independent with bed mobility. Pt supervision with transfers.  Pt supervision with amb with RW X 300 feet

## 2022-07-12 NOTE — ED CDU PROVIDER SUBSEQUENT DAY NOTE - ATTENDING APP SHARED VISIT CONTRIBUTION OF CARE
I, Michael Reagan, have personally performed a face to face diagnostic evaluation on this patient. I have reviewed the RANDI note and agree with the history, exam and plan of care, except as noted.    80 yo M p/w dizziness and weakness. CT head unremarkable. MR without acute stroke. patient walked with PT. patient comfortable going home with rolling walker and home PT.

## 2022-07-12 NOTE — ED CDU PROVIDER SUBSEQUENT DAY NOTE - MEDICAL DECISION MAKING DETAILS
80 yo male PMHx memory impairment, BPH with leg weakness, causing leg to give out. MRI negative for acute stroke. Pending PT/SW evaluation.

## 2022-07-12 NOTE — ED ADULT NURSE REASSESSMENT NOTE - NS ED NURSE REASSESS COMMENT FT1
Pt alert with periods of confusion, place constantly getting out of bed, weakness to lower extremities, redirect pt several times back to bed, pt placed on 1:1 for fall risk safety, monitoring continues

## 2022-07-12 NOTE — ED ADULT NURSE REASSESSMENT NOTE - NS ED NURSE REASSESS COMMENT FT1
Pt received from SARANYA Luz- and assumed care @ 0730 hrs.   Patient is A&Ox2 Person and place.  Bilateral  Lung sounds are clear,  Pt is resting with unlabored even resperations on a 1:1 pending observation bed  for PT and Social work intervention.  _Vital signs within normal limits.  Plan of care explained and reviewed with patient, call bell within reach.

## 2022-07-13 LAB
CULTURE RESULTS: NO GROWTH — SIGNIFICANT CHANGE UP
SPECIMEN SOURCE: SIGNIFICANT CHANGE UP

## 2022-10-17 ENCOUNTER — RESULT REVIEW (OUTPATIENT)
Age: 82
End: 2022-10-17

## 2022-10-18 ENCOUNTER — APPOINTMENT (OUTPATIENT)
Dept: DERMATOLOGY | Facility: CLINIC | Age: 82
End: 2022-10-18
Payer: MEDICARE

## 2022-10-18 PROCEDURE — 11103 TANGNTL BX SKIN EA SEP/ADDL: CPT

## 2022-10-18 PROCEDURE — 11102 TANGNTL BX SKIN SINGLE LES: CPT

## 2022-10-18 PROCEDURE — 99213 OFFICE O/P EST LOW 20 MIN: CPT | Mod: 25

## 2022-11-09 ENCOUNTER — APPOINTMENT (OUTPATIENT)
Dept: DERMATOLOGY | Facility: CLINIC | Age: 82
End: 2022-11-09

## 2022-11-09 DIAGNOSIS — C44.319 BASAL CELL CARCINOMA OF SKIN OF OTHER PARTS OF FACE: ICD-10-CM

## 2022-11-09 PROCEDURE — 12052 INTMD RPR FACE/MM 2.6-5.0 CM: CPT

## 2022-11-09 PROCEDURE — 17311 MOHS 1 STAGE H/N/HF/G: CPT

## 2022-11-09 RX ORDER — MUPIROCIN 20 MG/G
2 OINTMENT TOPICAL TWICE DAILY
Qty: 1 | Refills: 2 | Status: ACTIVE | COMMUNITY
Start: 2022-11-09 | End: 1900-01-01

## 2022-11-16 ENCOUNTER — APPOINTMENT (OUTPATIENT)
Dept: DERMATOLOGY | Facility: CLINIC | Age: 82
End: 2022-11-16

## 2022-11-16 DIAGNOSIS — C44.41 BASAL CELL CARCINOMA OF SKIN OF SCALP AND NECK: ICD-10-CM

## 2022-11-16 PROCEDURE — 17311 MOHS 1 STAGE H/N/HF/G: CPT | Mod: 79

## 2022-11-16 PROCEDURE — 12042 INTMD RPR N-HF/GENIT2.6-7.5: CPT | Mod: 79

## 2022-11-17 PROBLEM — C44.41 BASAL CELL CARCINOMA (BCC) OF LEFT SIDE OF NECK: Status: ACTIVE | Noted: 2022-11-17

## 2023-02-23 ENCOUNTER — APPOINTMENT (OUTPATIENT)
Dept: DERMATOLOGY | Facility: CLINIC | Age: 83
End: 2023-02-23
Payer: MEDICARE

## 2023-02-23 PROCEDURE — 99213 OFFICE O/P EST LOW 20 MIN: CPT

## 2023-06-19 ENCOUNTER — OFFICE (OUTPATIENT)
Dept: URBAN - METROPOLITAN AREA CLINIC 113 | Facility: CLINIC | Age: 83
Setting detail: OPHTHALMOLOGY
End: 2023-06-19
Payer: MEDICARE

## 2023-06-19 VITALS — HEIGHT: 60 IN

## 2023-06-19 DIAGNOSIS — H43.393: ICD-10-CM

## 2023-06-19 DIAGNOSIS — H35.373: ICD-10-CM

## 2023-06-19 DIAGNOSIS — H26.493: ICD-10-CM

## 2023-06-19 DIAGNOSIS — H49.23: ICD-10-CM

## 2023-06-19 DIAGNOSIS — H40.013: ICD-10-CM

## 2023-06-19 DIAGNOSIS — H43.813: ICD-10-CM

## 2023-06-19 DIAGNOSIS — H01.004: ICD-10-CM

## 2023-06-19 DIAGNOSIS — H01.001: ICD-10-CM

## 2023-06-19 PROCEDURE — 92004 COMPRE OPH EXAM NEW PT 1/>: CPT | Performed by: STUDENT IN AN ORGANIZED HEALTH CARE EDUCATION/TRAINING PROGRAM

## 2023-06-19 PROCEDURE — 92250 FUNDUS PHOTOGRAPHY W/I&R: CPT | Performed by: STUDENT IN AN ORGANIZED HEALTH CARE EDUCATION/TRAINING PROGRAM

## 2023-06-19 ASSESSMENT — KERATOMETRY
OS_AXISANGLE_DEGREES: 118
OS_K2POWER_DIOPTERS: 44.00
OD_AXISANGLE_DEGREES: 090
METHOD_AUTO_MANUAL: AUTO
OS_K1POWER_DIOPTERS: 43.50
OD_K2POWER_DIOPTERS: 44.25
OD_K1POWER_DIOPTERS: 44.25

## 2023-06-19 ASSESSMENT — REFRACTION_CURRENTRX
OS_OVR_VA: 20/
OD_OVR_VA: 20/
OD_AXIS: 0
OD_VPRISM_DIRECTION: SV
OD_OVR_VA: 20/
OD_ADD: +2.75
OD_CYLINDER: SPH
OD_OVR_VA: 20/
OD_SPHERE: +2.50
OD_SPHERE: +2.50
OD_SPHERE: +1.75
OS_CYLINDER: -0.50
OS_ADD: +2.75
OS_AXIS: 179
OD_VPRISM_DIRECTION: SV
OS_SPHERE: +2.25
OS_OVR_VA: 20/
OS_CYLINDER: -0.75
OS_VPRISM_DIRECTION: SV
OS_SPHERE: +2.25
OS_OVR_VA: 20/
OD_AXIS: 048
OD_CYLINDER: -0.25
OS_SPHERE: +1.75
OS_VPRISM_DIRECTION: SV
OS_AXIS: 003

## 2023-06-19 ASSESSMENT — REFRACTION_MANIFEST
OD_VA1: 20/20
OD_CYLINDER: -0.50
OD_SPHERE: PLANO
OS_SPHERE: PLANO
OS_CYLINDER: -0.50
OS_VA1: 20/20
OD_ADD: +2.50
OS_VA1: 20/20
OS_SPHERE: PLANO
OS_AXIS: 30
OD_SPHERE: PLANO
OS_ADD: +2.50
OD_AXIS: 115
OD_VA1: 20/20

## 2023-06-19 ASSESSMENT — AXIALLENGTH_DERIVED
OS_AL: 23.6463
OD_AL: 23.6086

## 2023-06-19 ASSESSMENT — LID EXAM ASSESSMENTS
OS_BLEPHARITIS: 2+
OD_BLEPHARITIS: 2+

## 2023-06-19 ASSESSMENT — REFRACTION_AUTOREFRACTION
OD_AXIS: 110
OD_SPHERE: -0.25
OS_AXIS: 077
OS_CYLINDER: -0.75
OD_CYLINDER: -1.00
OS_SPHERE: 0.00

## 2023-06-19 ASSESSMENT — VISUAL ACUITY
OD_BCVA: 20/25-1
OS_BCVA: 20/20-1

## 2023-06-19 ASSESSMENT — PACHYMETRY
OS_CT_CORRECTION: -1
OD_CT_UM: 548
OD_CT_CORRECTION: 0
OS_CT_UM: 558

## 2023-06-19 ASSESSMENT — SPHEQUIV_DERIVED
OD_SPHEQUIV: -0.75
OS_SPHEQUIV: -0.375

## 2023-06-19 ASSESSMENT — CONFRONTATIONAL VISUAL FIELD TEST (CVF)
OS_FINDINGS: FULL
OD_FINDINGS: FULL

## 2023-06-19 ASSESSMENT — TONOMETRY
OD_IOP_MMHG: 15
OS_IOP_MMHG: 15

## 2023-07-08 ENCOUNTER — EMERGENCY (EMERGENCY)
Facility: HOSPITAL | Age: 83
LOS: 1 days | Discharge: HOME | End: 2023-07-08
Attending: STUDENT IN AN ORGANIZED HEALTH CARE EDUCATION/TRAINING PROGRAM
Payer: MEDICARE

## 2023-07-08 VITALS
TEMPERATURE: 98 F | RESPIRATION RATE: 18 BRPM | DIASTOLIC BLOOD PRESSURE: 79 MMHG | OXYGEN SATURATION: 98 % | HEART RATE: 58 BPM | SYSTOLIC BLOOD PRESSURE: 145 MMHG

## 2023-07-08 VITALS
WEIGHT: 160.06 LBS | OXYGEN SATURATION: 99 % | HEIGHT: 70 IN | RESPIRATION RATE: 16 BRPM | TEMPERATURE: 98 F | SYSTOLIC BLOOD PRESSURE: 138 MMHG | HEART RATE: 51 BPM | DIASTOLIC BLOOD PRESSURE: 73 MMHG

## 2023-07-08 DIAGNOSIS — Z98.890 OTHER SPECIFIED POSTPROCEDURAL STATES: Chronic | ICD-10-CM

## 2023-07-08 PROCEDURE — 99283 EMERGENCY DEPT VISIT LOW MDM: CPT | Mod: 25

## 2023-07-08 PROCEDURE — 73080 X-RAY EXAM OF ELBOW: CPT

## 2023-07-08 PROCEDURE — 73080 X-RAY EXAM OF ELBOW: CPT | Mod: 26,LT

## 2023-07-08 PROCEDURE — 90715 TDAP VACCINE 7 YRS/> IM: CPT

## 2023-07-08 PROCEDURE — 99284 EMERGENCY DEPT VISIT MOD MDM: CPT

## 2023-07-08 PROCEDURE — 90471 IMMUNIZATION ADMIN: CPT

## 2023-07-08 RX ORDER — TETANUS TOXOID, REDUCED DIPHTHERIA TOXOID AND ACELLULAR PERTUSSIS VACCINE, ADSORBED 5; 2.5; 8; 8; 2.5 [IU]/.5ML; [IU]/.5ML; UG/.5ML; UG/.5ML; UG/.5ML
0.5 SUSPENSION INTRAMUSCULAR ONCE
Refills: 0 | Status: COMPLETED | OUTPATIENT
Start: 2023-07-08 | End: 2023-07-08

## 2023-07-08 RX ADMIN — TETANUS TOXOID, REDUCED DIPHTHERIA TOXOID AND ACELLULAR PERTUSSIS VACCINE, ADSORBED 0.5 MILLILITER(S): 5; 2.5; 8; 8; 2.5 SUSPENSION INTRAMUSCULAR at 18:21

## 2023-07-08 NOTE — ED PROVIDER NOTE - PATIENT PORTAL LINK FT
You can access the FollowMyHealth Patient Portal offered by [facility] by registering at the following website: [email or web address]. By joining [employer]’s FollowMyHealth portal, you will also be able to view your health information using other applications (apps) compatible with our system.

## 2023-07-08 NOTE — ED PROVIDER NOTE - CLINICAL SUMMARY MEDICAL DECISION MAKING FREE TEXT BOX
83yo male with pmh of dementia presents s/p trip and fall onto his left elbow, witnessed no head trauma, elbow skin tear wound dressed, adacel, xray, 83yo male with pmh of dementia presents s/p trip and fall onto his left elbow, witnessed no head trauma, elbow skin tear wound dressed, adacel, xray no acute fx or dislocation, stable for dc with ofollow up

## 2023-07-08 NOTE — ED PROVIDER NOTE - PHYSICAL EXAMINATION
Const: Awake, alert and orientedx2. In no acute distress. Well appearing.  HEENT: NC/AT. Moist mucous membranes.  Eyes: No scleral icterus. EOMI.  Neck:. Soft and supple. Full ROM without pain.  Cardiac: Regular rate and regular rhythm. +S1/S2. Peripheral pulses 2+ and symmetric. No LE edema.  Resp: Speaking in full sentences. No evidence of respiratory distress. No wheezes, rales or rhonchi.  Abd: Soft, non-tender, non-distended. Normal bowel sounds in all 4 quadrants. No guarding or rebound.  Back: Spine midline and non-tender. No CVAT.  Skin: skin tear to the left elbow no active bleeding   Lymph: No cervical lymphadenopathy.  Neuro: A&Ox2, moving all extremities symmetrically, follows commands, motor roya upper and lower ext 5/5, sensory symm and intact CN 2-12 grossly intact, no ataxia, no nystagmus, no dysmetria, no ddk, symm roya, no pronator drift

## 2023-07-08 NOTE — ED PROVIDER NOTE - NSFOLLOWUPINSTRUCTIONS_ED_ALL_ED_FT
Understanding Your Risk for Falls    Each year, millions of people have serious injuries from falls. It is important to understand your risk for falling. Talk with your health care provider about your risk and what you can do to lower it. There are actions you can take at home to lower your risk.    If you do have a serious fall, make sure you tell your health care provider. Falling once raises your risk for falling again.    How can falls affect me?  Serious injuries from falls are common. These include:    Broken bones, such as hip fractures.  Head injuries, such as traumatic brain injuries (TBI).    Fear of falling can also cause you to avoid activities and stay at home. This can make your muscles weaker and actually raise your risk for a fall.    What can increase my risk?  There are a number of risk factors that increase your risk for falling. The more risk factors you have, the higher your risk for falling. Serious injuries from a fall most often happen to people older than age 65. Children and young adults ages 15–29 are also at higher risk.    Common risk factors include:    Weakness in the lower body.  Lack (deficiency) of vitamin D.  Being generally weak or confused due to long-term (chronic) illness.  Dizziness or balance problems.  Poor vision.  Medicines that cause dizziness or drowsiness. These can include medicines for your blood pressure, heart, anxiety, insomnia, or edema, as well as pain medicines and muscle relaxants.    Other risk factors include:    Drinking alcohol.  Having had a fall in the past.  Having depression.  Foot pain or improper footwear.  Working at a dangerous job.  Having any of the following in your home:    Tripping hazards, such as floor clutter or loose rugs.  Poor lighting.  Pets or clutter.  Dementia or memory loss.    What actions can I take to lower my risk of falling?          Physical activity    Maintain physical fitness. Do strength and balance exercises. Consider taking a regular class to build strength and balance. Yoga and taylor chi are good options.        Vision    Have your eyes checked every year and your vision prescription updated as needed.        Walking aids and footwear    Wear nonskid shoes. Do not wear high heels.  Do not walk around the house in socks or slippers.  Use a cane or walker as told by your health care provider.        Home safety    Attach secure railings on both sides of your stairs.  Install grab bars for your tub, shower, and toilet. Use a bath mat in your tub or shower.  Use good lighting in all rooms. Keep a flashlight near your bed.  Make sure there is a clear path from your bed to the bathroom. Use night-lights.  Do not use throw rugs. Make sure all carpeting is taped or tacked down securely.  Remove all clutter from walkways and stairways, including extension cords.  Repair uneven or broken steps.  Avoid walking on icy or slippery surfaces. Walk on the grass instead of on icy or slick sidewalks. Where you can, use ice melt to get rid of ice on walkways.  Use a cordless phone.    Questions to ask your health care provider  Can you help me check my risk for a fall?  Do any of my medicines make me more likely to fall?  Should I take a vitamin D supplement?  What exercises can I do to improve my strength and balance?  Should I make an appointment to have my vision checked?  Do I need a bone density test to check for weak bones or osteoporosis?  Would it help to use a cane or a walker?    Where to find more information  Centers for Disease Control and PreventionJANAK: www.cdc.gov  Community-Based Fall Prevention Programs: www.cdc.gov  [place] on Aging: zg4drix.ida.nih.gov    Contact a health care provider if:  You fall at home.  You are afraid of falling at home.  You feel weak, drowsy, or dizzy.    Summary  People 65 and older are at high risk for falling. However, older people are not the only ones injured in falls. Children and young adults have a higher-than-normal risk too.  Talk with your health care provider about your risks for falling and how to lower those risks.  Taking certain precautions at home can lower your risk for falling.  If you fall, always tell your health care provider.    -Please follow-up with your primary care doctor in the next 2 days.  Please call tomorrow for an appointment.  If you cannot follow-up with your primary care doctor please return to the ED for any urgent issues.  - You were given a copy of the tests performed today.  Please bring the results with you and review them with your primary care doctor.  - If you have any worsening of symptoms or any other concerns please return to the ED immediately.  - Please continue taking your home medications as directed.

## 2023-07-08 NOTE — ED ADULT TRIAGE NOTE - CHIEF COMPLAINT QUOTE
Pt s/p trip and fall in the lobby of his assisted living. Pt's elbow hit the wall. Has a skin tear. Unsure of last tetanus. Pt has h/o dementia. His girlfriend states that they were at the beach all day.

## 2023-09-25 ENCOUNTER — APPOINTMENT (OUTPATIENT)
Dept: DERMATOLOGY | Facility: CLINIC | Age: 83
End: 2023-09-25

## 2023-10-26 ENCOUNTER — APPOINTMENT (OUTPATIENT)
Dept: DERMATOLOGY | Facility: CLINIC | Age: 83
End: 2023-10-26

## 2024-01-17 ENCOUNTER — OFFICE (OUTPATIENT)
Dept: URBAN - METROPOLITAN AREA CLINIC 113 | Facility: CLINIC | Age: 84
Setting detail: OPHTHALMOLOGY
End: 2024-01-17
Payer: MEDICARE

## 2024-01-17 DIAGNOSIS — H01.004: ICD-10-CM

## 2024-01-17 DIAGNOSIS — H40.013: ICD-10-CM

## 2024-01-17 DIAGNOSIS — H35.373: ICD-10-CM

## 2024-01-17 DIAGNOSIS — H01.001: ICD-10-CM

## 2024-01-17 PROCEDURE — 99213 OFFICE O/P EST LOW 20 MIN: CPT | Performed by: STUDENT IN AN ORGANIZED HEALTH CARE EDUCATION/TRAINING PROGRAM

## 2024-01-17 PROCEDURE — 92133 CPTRZD OPH DX IMG PST SGM ON: CPT | Performed by: STUDENT IN AN ORGANIZED HEALTH CARE EDUCATION/TRAINING PROGRAM

## 2024-01-17 ASSESSMENT — REFRACTION_AUTOREFRACTION
OS_AXIS: 077
OD_SPHERE: PLANO
OS_CYLINDER: -1.50
OS_SPHERE: +0.75
OD_CYLINDER: -0.50
OD_AXIS: 103

## 2024-01-17 ASSESSMENT — REFRACTION_CURRENTRX
OD_OVR_VA: 20/
OD_VPRISM_DIRECTION: SV
OD_SPHERE: +1.75
OS_AXIS: 003
OD_SPHERE: +2.50
OS_CYLINDER: -0.75
OD_OVR_VA: 20/
OS_ADD: +2.75
OD_VPRISM_DIRECTION: SV
OD_AXIS: 0
OS_OVR_VA: 20/
OS_VPRISM_DIRECTION: SV
OD_AXIS: 048
OS_OVR_VA: 20/
OD_SPHERE: +2.50
OD_ADD: +2.75
OS_SPHERE: +1.75
OD_OVR_VA: 20/
OS_SPHERE: +2.25
OS_OVR_VA: 20/
OS_SPHERE: +2.25
OS_CYLINDER: -0.50
OS_AXIS: 179
OS_VPRISM_DIRECTION: SV
OD_CYLINDER: -0.25
OD_CYLINDER: SPH

## 2024-01-17 ASSESSMENT — LID EXAM ASSESSMENTS
OD_BLEPHARITIS: 2+
OS_BLEPHARITIS: 2+

## 2024-01-17 ASSESSMENT — REFRACTION_MANIFEST
OD_VA1: 20/20
OD_SPHERE: PLANO
OS_SPHERE: PLANO
OD_VA1: 20/20
OS_SPHERE: PLANO
OD_CYLINDER: -0.50
OS_VA1: 20/20
OD_AXIS: 115
OS_CYLINDER: -0.50
OS_VA1: 20/20
OS_AXIS: 30
OD_ADD: +2.50
OS_ADD: +2.50
OD_SPHERE: PLANO

## 2024-01-17 ASSESSMENT — CONFRONTATIONAL VISUAL FIELD TEST (CVF)
OS_FINDINGS: FULL
OD_FINDINGS: FULL

## 2024-01-17 ASSESSMENT — SPHEQUIV_DERIVED: OS_SPHEQUIV: 0

## 2024-02-08 ENCOUNTER — APPOINTMENT (OUTPATIENT)
Dept: DERMATOLOGY | Facility: CLINIC | Age: 84
End: 2024-02-08
Payer: MEDICARE

## 2024-02-08 PROCEDURE — 99213 OFFICE O/P EST LOW 20 MIN: CPT | Mod: 25

## 2024-02-08 PROCEDURE — 17110 DESTRUCTION B9 LES UP TO 14: CPT

## 2024-09-26 NOTE — ED PROVIDER NOTE - NSICDXPASTSURGICALHX_GEN_ALL_CORE_FT
PAST SURGICAL HISTORY:  H/O knee surgery right knee    H/O rotator cuff surgery      Attending Attestation (For Attendings USE Only)...

## 2024-11-05 ENCOUNTER — RESULT REVIEW (OUTPATIENT)
Age: 84
End: 2024-11-05

## 2024-11-05 ENCOUNTER — APPOINTMENT (OUTPATIENT)
Dept: DERMATOLOGY | Facility: CLINIC | Age: 84
End: 2024-11-05
Payer: MEDICARE

## 2024-11-05 PROCEDURE — 17000 DESTRUCT PREMALG LESION: CPT | Mod: 59

## 2024-11-05 PROCEDURE — 17271 DSTR MAL LES S/N/H/F/G 0.6-1: CPT

## 2024-11-05 PROCEDURE — 17003 DESTRUCT PREMALG LES 2-14: CPT | Mod: 59

## 2024-11-05 PROCEDURE — 99213 OFFICE O/P EST LOW 20 MIN: CPT | Mod: 25

## 2024-12-06 ENCOUNTER — OFFICE (OUTPATIENT)
Dept: URBAN - METROPOLITAN AREA CLINIC 113 | Facility: CLINIC | Age: 84
Setting detail: OPHTHALMOLOGY
End: 2024-12-06
Payer: MEDICARE

## 2024-12-06 DIAGNOSIS — H00.12: ICD-10-CM

## 2024-12-06 DIAGNOSIS — H01.005: ICD-10-CM

## 2024-12-06 DIAGNOSIS — Z96.1: ICD-10-CM

## 2024-12-06 DIAGNOSIS — H43.813: ICD-10-CM

## 2024-12-06 DIAGNOSIS — H40.1132: ICD-10-CM

## 2024-12-06 DIAGNOSIS — H02.132: ICD-10-CM

## 2024-12-06 DIAGNOSIS — H01.002: ICD-10-CM

## 2024-12-06 DIAGNOSIS — H35.373: ICD-10-CM

## 2024-12-06 DIAGNOSIS — H26.493: ICD-10-CM

## 2024-12-06 PROCEDURE — 92083 EXTENDED VISUAL FIELD XM: CPT | Performed by: OPHTHALMOLOGY

## 2024-12-06 PROCEDURE — 92250 FUNDUS PHOTOGRAPHY W/I&R: CPT | Performed by: OPHTHALMOLOGY

## 2024-12-06 PROCEDURE — 99214 OFFICE O/P EST MOD 30 MIN: CPT | Performed by: OPHTHALMOLOGY

## 2024-12-06 ASSESSMENT — REFRACTION_CURRENTRX
OS_OVR_VA: 20/
OS_SPHERE: +2.25
OS_AXIS: 179
OD_VPRISM_DIRECTION: SV
OS_VPRISM_DIRECTION: SV
OD_AXIS: 048
OS_CYLINDER: -0.50
OD_CYLINDER: SPH
OS_CYLINDER: -0.75
OS_ADD: +2.75
OD_AXIS: 0
OS_SPHERE: +1.75
OD_SPHERE: +2.50
OS_VPRISM_DIRECTION: SV
OD_ADD: +2.75
OD_VPRISM_DIRECTION: SV
OS_AXIS: 003
OD_OVR_VA: 20/
OS_SPHERE: +2.25
OD_CYLINDER: -0.25
OD_OVR_VA: 20/
OD_SPHERE: +2.50
OS_OVR_VA: 20/
OS_OVR_VA: 20/
OD_OVR_VA: 20/
OD_SPHERE: +1.75

## 2024-12-06 ASSESSMENT — REFRACTION_MANIFEST
OS_VA1: 20/20
OS_CYLINDER: -1.50
OS_AXIS: 30
OD_SPHERE: PLANO
OD_AXIS: 115
OS_CYLINDER: -0.50
OD_ADD: +2.50
OD_CYLINDER: -0.50
OS_SPHERE: PLANO
OU_VA: 20/30
OS_VA1: 20/20
OS_SPHERE: PLANO
OS_AXIS: 071
OD_CYLINDER: -0.25
OS_VA1: 20/40
OD_AXIS: 121
OD_SPHERE: -0.75
OS_ADD: +2.50
OS_SPHERE: +0.50
OD_VA1: 20/20
OD_SPHERE: PLANO
OD_VA1: 20/30
OD_VA1: 20/20

## 2024-12-06 ASSESSMENT — REFRACTION_AUTOREFRACTION
OS_SPHERE: +0.75
OS_AXIS: 077
OD_AXIS: 103
OD_SPHERE: PLANO
OD_CYLINDER: -0.50
OS_CYLINDER: -1.50

## 2024-12-06 ASSESSMENT — KERATOMETRY
OS_K2POWER_DIOPTERS: 44.00
OS_K1POWER_DIOPTERS: 43.50
OD_K1POWER_DIOPTERS: 44.25
OD_AXISANGLE_DEGREES: 179
OS_AXISANGLE_DEGREES: 153
OD_K2POWER_DIOPTERS: 44.75
METHOD_AUTO_MANUAL: AUTO

## 2024-12-06 ASSESSMENT — CONFRONTATIONAL VISUAL FIELD TEST (CVF)
OS_FINDINGS: FULL
OD_FINDINGS: FULL

## 2024-12-06 ASSESSMENT — LID EXAM ASSESSMENTS
OS_BLEPHARITIS: LLL 2+
OD_BLEPHARITIS: RLL 2+

## 2024-12-06 ASSESSMENT — TONOMETRY
OS_IOP_MMHG: 14
OD_IOP_MMHG: 14

## 2024-12-06 ASSESSMENT — VISUAL ACUITY
OS_BCVA: 20/40
OD_BCVA: 20/50

## 2024-12-06 ASSESSMENT — PACHYMETRY
OD_CT_UM: 548
OD_CT_CORRECTION: 0
OS_CT_UM: 558
OS_CT_CORRECTION: -1

## 2024-12-06 ASSESSMENT — LID POSITION - ECTROPION: OD_ECTROPION: RLL 1+

## 2025-01-11 ENCOUNTER — OFFICE (OUTPATIENT)
Dept: URBAN - METROPOLITAN AREA CLINIC 112 | Facility: CLINIC | Age: 85
Setting detail: OPHTHALMOLOGY
End: 2025-01-11
Payer: MEDICARE

## 2025-01-11 DIAGNOSIS — H01.002: ICD-10-CM

## 2025-01-11 DIAGNOSIS — H01.005: ICD-10-CM

## 2025-01-11 DIAGNOSIS — H00.12: ICD-10-CM

## 2025-01-11 DIAGNOSIS — H02.132: ICD-10-CM

## 2025-01-11 PROCEDURE — 92014 COMPRE OPH EXAM EST PT 1/>: CPT | Performed by: OPHTHALMOLOGY

## 2025-01-11 PROCEDURE — 92285 EXTERNAL OCULAR PHOTOGRAPHY: CPT | Performed by: OPHTHALMOLOGY

## 2025-01-11 ASSESSMENT — VISUAL ACUITY
OD_BCVA: 20/40-
OS_BCVA: 20/80

## 2025-01-11 ASSESSMENT — KERATOMETRY
OS_AXISANGLE_DEGREES: 162
METHOD_AUTO_MANUAL: AUTO
OS_K1POWER_DIOPTERS: 43.00
OD_K2POWER_DIOPTERS: 45.00
OD_K1POWER_DIOPTERS: 44.50
OS_K2POWER_DIOPTERS: 44.25
OD_AXISANGLE_DEGREES: 034

## 2025-01-11 ASSESSMENT — REFRACTION_CURRENTRX
OS_SPHERE: +2.25
OS_OVR_VA: 20/
OD_OVR_VA: 20/
OD_OVR_VA: 20/
OD_CYLINDER: -0.25
OS_SPHERE: +1.75
OS_OVR_VA: 20/
OS_AXIS: 003
OS_CYLINDER: -0.75
OD_OVR_VA: 20/
OS_AXIS: 179
OD_VPRISM_DIRECTION: SV
OD_ADD: +2.75
OD_SPHERE: +2.50
OS_CYLINDER: -0.50
OS_ADD: +2.75
OD_CYLINDER: SPH
OD_AXIS: 0
OD_AXIS: 048
OD_VPRISM_DIRECTION: SV
OD_SPHERE: +1.75
OD_SPHERE: +2.50
OS_VPRISM_DIRECTION: SV
OS_VPRISM_DIRECTION: SV
OS_OVR_VA: 20/
OS_SPHERE: +2.25

## 2025-01-11 ASSESSMENT — LID EXAM ASSESSMENTS
OD_BLEPHARITIS: RLL 2+
OS_BLEPHARITIS: LLL 2+

## 2025-01-11 ASSESSMENT — REFRACTION_MANIFEST
OD_AXIS: 121
OD_ADD: +2.50
OS_CYLINDER: -1.50
OS_SPHERE: PLANO
OS_AXIS: 30
OS_CYLINDER: -0.50
OS_SPHERE: +0.50
OU_VA: 20/30
OD_CYLINDER: -0.25
OD_CYLINDER: -0.50
OD_VA1: 20/20
OD_VA1: 20/20
OS_AXIS: 071
OS_VA1: 20/40
OD_VA1: 20/30
OD_SPHERE: PLANO
OS_SPHERE: PLANO
OD_SPHERE: PLANO
OD_SPHERE: -0.75
OS_VA1: 20/20
OS_ADD: +2.50
OD_AXIS: 115
OS_VA1: 20/20

## 2025-01-11 ASSESSMENT — CONFRONTATIONAL VISUAL FIELD TEST (CVF)
OD_FINDINGS: FULL
OS_FINDINGS: FULL

## 2025-01-11 ASSESSMENT — LID POSITION - ECTROPION: OD_ECTROPION: RLL 2+

## 2025-01-11 ASSESSMENT — TONOMETRY
OS_IOP_MMHG: 16
OD_IOP_MMHG: 16

## 2025-01-11 ASSESSMENT — PACHYMETRY
OS_CT_CORRECTION: -1
OS_CT_UM: 558
OD_CT_UM: 548
OD_CT_CORRECTION: 0

## 2025-01-11 ASSESSMENT — REFRACTION_AUTOREFRACTION
OD_AXIS: 131
OD_SPHERE: -1.00
OD_CYLINDER: -0.75
OS_CYLINDER: -1.25
OS_AXIS: 075
OS_SPHERE: +0.50

## 2025-01-16 ENCOUNTER — OFFICE (OUTPATIENT)
Dept: URBAN - METROPOLITAN AREA CLINIC 113 | Facility: CLINIC | Age: 85
Setting detail: OPHTHALMOLOGY
End: 2025-01-16
Payer: MEDICARE

## 2025-01-16 DIAGNOSIS — H40.011: ICD-10-CM

## 2025-01-16 DIAGNOSIS — H40.012: ICD-10-CM

## 2025-01-16 DIAGNOSIS — H40.013: ICD-10-CM

## 2025-01-16 PROBLEM — H02.822 LESION; RIGHT LOWER LID: Status: ACTIVE | Noted: 2025-01-11

## 2025-01-16 PROCEDURE — 92020 GONIOSCOPY: CPT | Performed by: OPHTHALMOLOGY

## 2025-01-16 PROCEDURE — 92012 INTRM OPH EXAM EST PATIENT: CPT | Performed by: OPHTHALMOLOGY

## 2025-01-16 PROCEDURE — 92133 CPTRZD OPH DX IMG PST SGM ON: CPT | Performed by: OPHTHALMOLOGY

## 2025-01-16 ASSESSMENT — REFRACTION_MANIFEST
OD_CYLINDER: -0.50
OU_VA: 20/30
OD_VA1: 20/20
OD_AXIS: 121
OD_VA1: 20/20
OS_CYLINDER: -1.50
OD_SPHERE: PLANO
OS_SPHERE: +0.50
OD_CYLINDER: -0.25
OD_VA1: 20/30
OS_AXIS: 30
OD_ADD: +2.50
OS_VA1: 20/40
OS_SPHERE: PLANO
OS_SPHERE: PLANO
OS_VA1: 20/20
OS_CYLINDER: -0.50
OS_VA1: 20/20
OS_AXIS: 071
OS_ADD: +2.50
OD_SPHERE: -0.75
OD_SPHERE: PLANO
OD_AXIS: 115

## 2025-01-16 ASSESSMENT — REFRACTION_CURRENTRX
OD_OVR_VA: 20/
OS_ADD: +2.75
OS_CYLINDER: -0.75
OS_AXIS: 003
OD_VPRISM_DIRECTION: SV
OS_SPHERE: +2.25
OD_SPHERE: +2.50
OD_VPRISM_DIRECTION: SV
OD_CYLINDER: SPH
OS_OVR_VA: 20/
OD_AXIS: 048
OD_AXIS: 0
OS_SPHERE: +2.25
OS_CYLINDER: -0.50
OD_ADD: +2.75
OD_SPHERE: +1.75
OD_OVR_VA: 20/
OS_VPRISM_DIRECTION: SV
OD_CYLINDER: -0.25
OS_VPRISM_DIRECTION: SV
OD_SPHERE: +2.50
OD_OVR_VA: 20/
OS_OVR_VA: 20/
OS_AXIS: 179
OS_SPHERE: +1.75
OS_OVR_VA: 20/

## 2025-01-16 ASSESSMENT — CONFRONTATIONAL VISUAL FIELD TEST (CVF)
OS_FINDINGS: FULL
OD_FINDINGS: FULL

## 2025-01-16 ASSESSMENT — KERATOMETRY
OS_K2POWER_DIOPTERS: 44.25
OD_K1POWER_DIOPTERS: 44.25
OS_K1POWER_DIOPTERS: 43.50
OD_K2POWER_DIOPTERS: 44.75
OS_AXISANGLE_DEGREES: 158
METHOD_AUTO_MANUAL: AUTO
OD_AXISANGLE_DEGREES: 034

## 2025-01-16 ASSESSMENT — LID EXAM ASSESSMENTS
OS_BLEPHARITIS: LLL 2+
OD_BLEPHARITIS: RLL 2+

## 2025-01-16 ASSESSMENT — TONOMETRY
OS_IOP_MMHG: 15
OD_IOP_MMHG: 13
OS_IOP_MMHG: 13
OD_IOP_MMHG: 15

## 2025-01-16 ASSESSMENT — PACHYMETRY
OS_CT_CORRECTION: -1
OD_CT_UM: 548
OS_CT_UM: 558
OD_CT_CORRECTION: 0

## 2025-01-16 ASSESSMENT — REFRACTION_AUTOREFRACTION
OD_CYLINDER: -1.00
OS_SPHERE: +0.75
OS_CYLINDER: -1.50
OD_SPHERE: -0.25
OS_AXIS: 079
OD_AXIS: 105

## 2025-01-16 ASSESSMENT — VISUAL ACUITY
OD_BCVA: 20/30-1
OS_BCVA: 20/40-1

## 2025-01-16 ASSESSMENT — LID POSITION - ECTROPION: OD_ECTROPION: RLL 2+

## 2025-02-08 ENCOUNTER — OFFICE (OUTPATIENT)
Dept: URBAN - METROPOLITAN AREA CLINIC 112 | Facility: CLINIC | Age: 85
Setting detail: OPHTHALMOLOGY
End: 2025-02-08
Payer: MEDICARE

## 2025-02-08 DIAGNOSIS — H02.822: ICD-10-CM

## 2025-02-08 DIAGNOSIS — H02.132: ICD-10-CM

## 2025-02-08 DIAGNOSIS — H01.002: ICD-10-CM

## 2025-02-08 DIAGNOSIS — H01.005: ICD-10-CM

## 2025-02-08 PROCEDURE — 99213 OFFICE O/P EST LOW 20 MIN: CPT | Performed by: OPHTHALMOLOGY

## 2025-02-08 ASSESSMENT — REFRACTION_MANIFEST
OD_AXIS: 115
OD_SPHERE: PLANO
OD_VA1: 20/20
OD_VA1: 20/20
OS_SPHERE: +0.50
OS_VA1: 20/20
OS_CYLINDER: -1.50
OD_SPHERE: -0.75
OD_CYLINDER: -0.25
OD_ADD: +2.50
OS_VA1: 20/40
OD_VA1: 20/30
OS_SPHERE: PLANO
OU_VA: 20/30
OD_CYLINDER: -0.50
OD_AXIS: 121
OS_ADD: +2.50
OS_AXIS: 30
OS_VA1: 20/20
OD_SPHERE: PLANO
OS_SPHERE: PLANO
OS_CYLINDER: -0.50
OS_AXIS: 071

## 2025-02-08 ASSESSMENT — REFRACTION_CURRENTRX
OS_OVR_VA: 20/
OS_SPHERE: +2.25
OD_AXIS: 0
OS_VPRISM_DIRECTION: SV
OD_SPHERE: +2.50
OS_OVR_VA: 20/
OD_ADD: +2.75
OS_VPRISM_DIRECTION: SV
OD_OVR_VA: 20/
OD_VPRISM_DIRECTION: SV
OS_AXIS: 003
OS_SPHERE: +2.25
OS_CYLINDER: -0.50
OD_VPRISM_DIRECTION: SV
OD_OVR_VA: 20/
OS_CYLINDER: -0.75
OS_ADD: +2.75
OD_SPHERE: +2.50
OD_AXIS: 048
OD_OVR_VA: 20/
OD_CYLINDER: -0.25
OS_OVR_VA: 20/
OS_SPHERE: +1.75
OD_SPHERE: +1.75
OD_CYLINDER: SPH
OS_AXIS: 179

## 2025-02-08 ASSESSMENT — KERATOMETRY
OD_AXISANGLE_DEGREES: 034
OS_K1POWER_DIOPTERS: 43.50
METHOD_AUTO_MANUAL: AUTO
OS_K2POWER_DIOPTERS: 44.25
OS_AXISANGLE_DEGREES: 158
OD_K2POWER_DIOPTERS: 44.75
OD_K1POWER_DIOPTERS: 44.25

## 2025-02-08 ASSESSMENT — CONFRONTATIONAL VISUAL FIELD TEST (CVF)
OS_FINDINGS: FULL
OD_FINDINGS: FULL

## 2025-02-08 ASSESSMENT — LID POSITION - ECTROPION: OD_ECTROPION: RLL 2+

## 2025-02-08 ASSESSMENT — LID EXAM ASSESSMENTS
OS_BLEPHARITIS: LLL 2+
OD_BLEPHARITIS: RLL 2+

## 2025-02-08 ASSESSMENT — TONOMETRY
OS_IOP_MMHG: 18
OD_IOP_MMHG: 15

## 2025-02-08 ASSESSMENT — REFRACTION_AUTOREFRACTION
OS_SPHERE: +0.75
OS_CYLINDER: -1.50
OD_SPHERE: -0.25
OS_AXIS: 079
OD_AXIS: 105
OD_CYLINDER: -1.00

## 2025-02-08 ASSESSMENT — VISUAL ACUITY
OS_BCVA: 20/30-1
OD_BCVA: 20/30-1

## 2025-02-08 ASSESSMENT — PACHYMETRY
OD_CT_CORRECTION: 0
OD_CT_UM: 548
OS_CT_UM: 558
OS_CT_CORRECTION: -1

## 2025-04-12 ENCOUNTER — OFFICE (OUTPATIENT)
Dept: URBAN - METROPOLITAN AREA CLINIC 112 | Facility: CLINIC | Age: 85
Setting detail: OPHTHALMOLOGY
End: 2025-04-12
Payer: MEDICARE

## 2025-04-12 DIAGNOSIS — H02.822: ICD-10-CM

## 2025-04-12 DIAGNOSIS — H02.132: ICD-10-CM

## 2025-04-12 DIAGNOSIS — H01.002: ICD-10-CM

## 2025-04-12 DIAGNOSIS — H01.005: ICD-10-CM

## 2025-04-12 PROCEDURE — 92012 INTRM OPH EXAM EST PATIENT: CPT | Performed by: OPHTHALMOLOGY

## 2025-04-12 ASSESSMENT — REFRACTION_CURRENTRX
OS_SPHERE: +2.25
OD_OVR_VA: 20/
OD_AXIS: 0
OS_OVR_VA: 20/
OS_AXIS: 179
OS_VPRISM_DIRECTION: SV
OD_SPHERE: +2.50
OS_VPRISM_DIRECTION: SV
OS_OVR_VA: 20/
OD_OVR_VA: 20/
OS_SPHERE: +1.75
OS_CYLINDER: -0.75
OD_SPHERE: +1.75
OD_CYLINDER: SPH
OS_SPHERE: +2.25
OD_ADD: +2.75
OD_AXIS: 048
OD_VPRISM_DIRECTION: SV
OS_ADD: +2.75
OS_CYLINDER: -0.50
OD_OVR_VA: 20/
OD_SPHERE: +2.50
OD_VPRISM_DIRECTION: SV
OD_CYLINDER: -0.25
OS_AXIS: 003
OS_OVR_VA: 20/

## 2025-04-12 ASSESSMENT — KERATOMETRY
METHOD_AUTO_MANUAL: AUTO
OD_K2POWER_DIOPTERS: 44.75
OS_AXISANGLE_DEGREES: 158
OS_K1POWER_DIOPTERS: 43.50
OD_K1POWER_DIOPTERS: 44.25
OD_AXISANGLE_DEGREES: 034
OS_K2POWER_DIOPTERS: 44.25

## 2025-04-12 ASSESSMENT — REFRACTION_MANIFEST
OS_SPHERE: PLANO
OD_CYLINDER: -0.25
OD_ADD: +2.50
OS_CYLINDER: -0.50
OS_VA1: 20/20
OS_AXIS: 071
OS_VA1: 20/40
OS_SPHERE: +0.50
OS_SPHERE: PLANO
OD_SPHERE: PLANO
OD_AXIS: 121
OD_AXIS: 115
OD_VA1: 20/30
OD_SPHERE: -0.75
OS_AXIS: 30
OD_VA1: 20/20
OD_VA1: 20/20
OS_ADD: +2.50
OD_SPHERE: PLANO
OD_CYLINDER: -0.50
OU_VA: 20/30
OS_VA1: 20/20
OS_CYLINDER: -1.50

## 2025-04-12 ASSESSMENT — LID POSITION - ECTROPION: OD_ECTROPION: RLL 2+

## 2025-04-12 ASSESSMENT — LID EXAM ASSESSMENTS
OS_BLEPHARITIS: LLL 2+
OD_BLEPHARITIS: RLL 2+

## 2025-04-12 ASSESSMENT — PACHYMETRY
OD_CT_CORRECTION: 0
OS_CT_UM: 558
OS_CT_CORRECTION: -1
OD_CT_UM: 548

## 2025-04-12 ASSESSMENT — REFRACTION_AUTOREFRACTION
OD_CYLINDER: -1.00
OS_AXIS: 079
OS_CYLINDER: -1.50
OS_SPHERE: +0.75
OD_AXIS: 105
OD_SPHERE: -0.25

## 2025-04-12 ASSESSMENT — TONOMETRY
OS_IOP_MMHG: 14
OD_IOP_MMHG: 15

## 2025-04-12 ASSESSMENT — VISUAL ACUITY
OD_BCVA: 20/30-
OS_BCVA: 20/40

## 2025-05-05 ENCOUNTER — ASC (OUTPATIENT)
Dept: URBAN - METROPOLITAN AREA SURGERY 8 | Facility: SURGERY | Age: 85
Setting detail: OPHTHALMOLOGY
End: 2025-05-05
Payer: MEDICARE

## 2025-05-05 DIAGNOSIS — H02.132: ICD-10-CM

## 2025-05-05 DIAGNOSIS — H02.822: ICD-10-CM

## 2025-05-05 PROCEDURE — 67917 REPAIR EYELID DEFECT: CPT | Mod: RT | Performed by: OPHTHALMOLOGY

## 2025-05-05 PROCEDURE — 67715 CANTHOTOMY: CPT | Mod: RT | Performed by: OPHTHALMOLOGY

## 2025-05-06 ENCOUNTER — OFFICE (OUTPATIENT)
Dept: URBAN - METROPOLITAN AREA CLINIC 112 | Facility: CLINIC | Age: 85
Setting detail: OPHTHALMOLOGY
End: 2025-05-06
Payer: MEDICARE

## 2025-05-06 DIAGNOSIS — H02.132: ICD-10-CM

## 2025-05-06 DIAGNOSIS — H02.822: ICD-10-CM

## 2025-05-06 PROCEDURE — 99024 POSTOP FOLLOW-UP VISIT: CPT | Performed by: PHYSICIAN ASSISTANT

## 2025-05-06 ASSESSMENT — REFRACTION_CURRENTRX
OD_CYLINDER: -0.25
OD_CYLINDER: SPH
OS_OVR_VA: 20/
OS_VPRISM_DIRECTION: SV
OS_AXIS: 003
OS_CYLINDER: -0.75
OD_OVR_VA: 20/
OD_SPHERE: +2.50
OD_SPHERE: +2.50
OS_AXIS: 179
OS_SPHERE: +1.75
OS_CYLINDER: -0.50
OD_VPRISM_DIRECTION: SV
OD_AXIS: 0
OD_SPHERE: +1.75
OS_OVR_VA: 20/
OD_OVR_VA: 20/
OD_VPRISM_DIRECTION: SV
OS_SPHERE: +2.25
OS_SPHERE: +2.25
OS_ADD: +2.75
OD_AXIS: 048
OD_ADD: +2.75
OS_OVR_VA: 20/
OD_OVR_VA: 20/
OS_VPRISM_DIRECTION: SV

## 2025-05-06 ASSESSMENT — CONFRONTATIONAL VISUAL FIELD TEST (CVF)
OS_FINDINGS: FULL
OD_FINDINGS: FULL

## 2025-05-06 ASSESSMENT — VISUAL ACUITY
OS_BCVA: 20/30-2
OD_BCVA: 20/30-1

## 2025-05-06 ASSESSMENT — REFRACTION_MANIFEST
OS_SPHERE: +0.50
OD_ADD: +2.50
OS_SPHERE: PLANO
OD_AXIS: 121
OS_CYLINDER: -1.50
OS_CYLINDER: -0.50
OD_SPHERE: PLANO
OD_VA1: 20/30
OS_AXIS: 30
OU_VA: 20/30
OS_AXIS: 071
OS_VA1: 20/20
OD_AXIS: 115
OS_VA1: 20/40
OS_VA1: 20/20
OD_VA1: 20/20
OD_CYLINDER: -0.25
OD_SPHERE: -0.75
OD_VA1: 20/20
OS_ADD: +2.50
OD_CYLINDER: -0.50
OD_SPHERE: PLANO
OS_SPHERE: PLANO

## 2025-05-06 ASSESSMENT — KERATOMETRY
OD_K2POWER_DIOPTERS: 44.75
OS_AXISANGLE_DEGREES: 158
METHOD_AUTO_MANUAL: AUTO
OS_K1POWER_DIOPTERS: 43.50
OS_K2POWER_DIOPTERS: 44.25
OD_K1POWER_DIOPTERS: 44.25
OD_AXISANGLE_DEGREES: 034

## 2025-05-06 ASSESSMENT — REFRACTION_AUTOREFRACTION
OD_AXIS: 105
OD_CYLINDER: -1.00
OD_SPHERE: -0.25
OS_CYLINDER: -1.50
OS_AXIS: 079
OS_SPHERE: +0.75

## 2025-05-06 ASSESSMENT — LID EXAM ASSESSMENTS
OS_BLEPHARITIS: LLL 2+
OD_BLEPHARITIS: RLL 2+

## 2025-05-08 ENCOUNTER — APPOINTMENT (OUTPATIENT)
Dept: DERMATOLOGY | Facility: CLINIC | Age: 85
End: 2025-05-08

## 2025-06-08 ENCOUNTER — EMERGENCY (EMERGENCY)
Facility: HOSPITAL | Age: 85
LOS: 1 days | End: 2025-06-08
Attending: EMERGENCY MEDICINE
Payer: MEDICARE

## 2025-06-08 VITALS
SYSTOLIC BLOOD PRESSURE: 116 MMHG | HEART RATE: 68 BPM | TEMPERATURE: 99 F | OXYGEN SATURATION: 96 % | WEIGHT: 160.06 LBS | DIASTOLIC BLOOD PRESSURE: 64 MMHG | RESPIRATION RATE: 18 BRPM

## 2025-06-08 VITALS
DIASTOLIC BLOOD PRESSURE: 75 MMHG | RESPIRATION RATE: 17 BRPM | TEMPERATURE: 98 F | HEART RATE: 56 BPM | SYSTOLIC BLOOD PRESSURE: 145 MMHG | OXYGEN SATURATION: 97 %

## 2025-06-08 DIAGNOSIS — Z98.890 OTHER SPECIFIED POSTPROCEDURAL STATES: Chronic | ICD-10-CM

## 2025-06-08 PROCEDURE — 73562 X-RAY EXAM OF KNEE 3: CPT

## 2025-06-08 PROCEDURE — 99285 EMERGENCY DEPT VISIT HI MDM: CPT

## 2025-06-08 PROCEDURE — 70450 CT HEAD/BRAIN W/O DYE: CPT

## 2025-06-08 PROCEDURE — 70450 CT HEAD/BRAIN W/O DYE: CPT | Mod: 26

## 2025-06-08 PROCEDURE — 99284 EMERGENCY DEPT VISIT MOD MDM: CPT | Mod: 25

## 2025-06-08 PROCEDURE — 73562 X-RAY EXAM OF KNEE 3: CPT | Mod: 26,RT

## 2025-06-08 NOTE — ED ADULT NURSE NOTE - CHIEF COMPLAINT QUOTE
BIBEMS from [facility] assisted living, s/p fall while changing pants. Report right leg/knee gave out on him/tighten up. Denies hitting head. Not on blood thinners

## 2025-06-08 NOTE — ED PROVIDER NOTE - CLINICAL SUMMARY MEDICAL DECISION MAKING FREE TEXT BOX
85-year-old male with dementia presents from [facility] after being found kneeling on floor. Denies fall today but reports prior falls in past month. Walker found outside room. Denies knee pain but reports chronic stiffness. X ray with no acute fracture, CT head negative. Discharge

## 2025-06-08 NOTE — ED ADULT TRIAGE NOTE - CHIEF COMPLAINT QUOTE
BIBEMS from [facility] assisted living, s/p fall while changing pants. Report right leg/knee gave out on him/tighten up. Denies hitting head. Not on blood thinners

## 2025-06-08 NOTE — ED PROVIDER NOTE - ATTENDING CONTRIBUTION TO CARE
85-year-old male with dementia presents from [facility] after being found kneeling on floor. Denies fall today but reports prior falls in past month. Walker found outside room. Denies knee pain but reports chronic stiffness. X ray with no acute fracture, CT head negative. Discharge

## 2025-06-08 NOTE — ED PROVIDER NOTE - OBJECTIVE STATEMENT
85-year-old male with PMHx of dementia, hx of right knee surgery presents to ED from [facility] living with presumed fall. Patient has hx of dementia, AOx2 to person and place, denies any falls today but endorses he has fallen before in the past month. Collateral hx obtained from [facility] staff indicates patient was found on the floor on his knees and his walker was outside his room in the hallway. Patient denies any pain in the right knee or left knee, but states the right knee feels stiff and it has been bothering him for some time now. As per [facility] staff, patient was noted to be more stressed out today but otherwise at baseline. Denies dizziness, headache, hitting his head. Not on blood thinners. On examination is able to move right knee fully without any significant pain or weakness.

## 2025-06-08 NOTE — ED PROVIDER NOTE - PHYSICAL EXAMINATION
GENERAL: no acute distress, comfortably in bed  HEAD: NC/AT  EYES: PERRLA, EOMI, Non-icteric  ENT: Mucous membranes moist, neck supple  NEURO: No focal deficits, moving all extremities spontaneously, A&Ox2, no dysarthria  PSYCH: Normal affect, calm, appropriate insight and judgment, fluent speech  RESP: CTAB, no wrr, non-labored breathing  CVS: RRR, no murmur appreciated  GI: Soft, non-tender, non-distended, no organomegaly, no appreciable masses, +bs all 4 quadrants  MSK: Right knee with some erythema, no warmth, nontender full ROM  SKIN: No rashes or lesions

## 2025-06-08 NOTE — ED PROVIDER NOTE - PATIENT PORTAL LINK FT
You can access the FollowMyHealth Patient Portal offered by [facility] by registering at the following website: [email or web address]. By joining [employer]’s FollowMyHealth portal, you will also be able to view your health information using other applications (apps) compatible with our system.

## 2025-06-08 NOTE — ED ADULT NURSE NOTE - OBJECTIVE STATEMENT
pt comes in s/p unwitnessed fall at assisted living today, pt was found on his knees on the floor but it is unknown whether there was +headstrike, pt is alert but confused, no other complaints at this time

## 2025-06-09 ENCOUNTER — EMERGENCY (EMERGENCY)
Facility: HOSPITAL | Age: 85
LOS: 1 days | End: 2025-06-09
Attending: EMERGENCY MEDICINE
Payer: MEDICARE

## 2025-06-09 VITALS
SYSTOLIC BLOOD PRESSURE: 149 MMHG | DIASTOLIC BLOOD PRESSURE: 79 MMHG | TEMPERATURE: 98 F | HEART RATE: 62 BPM | OXYGEN SATURATION: 100 % | RESPIRATION RATE: 18 BRPM

## 2025-06-09 VITALS
WEIGHT: 179.9 LBS | DIASTOLIC BLOOD PRESSURE: 51 MMHG | SYSTOLIC BLOOD PRESSURE: 110 MMHG | HEART RATE: 61 BPM | TEMPERATURE: 98 F | RESPIRATION RATE: 18 BRPM | OXYGEN SATURATION: 98 % | HEIGHT: 68 IN

## 2025-06-09 DIAGNOSIS — Z98.890 OTHER SPECIFIED POSTPROCEDURAL STATES: Chronic | ICD-10-CM

## 2025-06-09 PROCEDURE — 70450 CT HEAD/BRAIN W/O DYE: CPT | Mod: 26

## 2025-06-09 PROCEDURE — 72125 CT NECK SPINE W/O DYE: CPT | Mod: 26

## 2025-06-09 PROCEDURE — 70450 CT HEAD/BRAIN W/O DYE: CPT

## 2025-06-09 PROCEDURE — 72125 CT NECK SPINE W/O DYE: CPT

## 2025-06-09 PROCEDURE — 99284 EMERGENCY DEPT VISIT MOD MDM: CPT

## 2025-06-09 PROCEDURE — 99284 EMERGENCY DEPT VISIT MOD MDM: CPT | Mod: 25

## 2025-06-09 NOTE — ED PROVIDER NOTE - PROGRESS NOTE DETAILS
Pt remains feeling well on reassessment. Spoke with pt and son at bedside regarding negative CT results. Offered PT evaluation for possible [facility] placement, son declines at this time, stating he is planning on making calls to expand their daytime aide coverage - pt typically ambulates with walker but does not use his walker in his home as instructed. Return precautions discussed, son comfortable with plan for dc. Bryan Jay MD

## 2025-06-09 NOTE — ED ADULT NURSE NOTE - CHIEF COMPLAINT QUOTE
pt c/o fall at home today, was just discharged from the ED last night, wife went to the store and found him on the floor. patient with hx of Dementia and is a poor historian, denies any symptoms. no sign of trauma

## 2025-06-09 NOTE — ED PROVIDER NOTE - ATTENDING CONTRIBUTION TO CARE
I personally saw the patient with the resident, and completed the key components of the history and physical exam. I then discussed the management plan with the resident.    83 y/o M with PMH dementia presents for an unwitnessed fall today. Was here for the same yesterday, has a walker, doesn't remember to use it. Son at bedside agrees patient is at baseline mental status.    I agree with exam as documented.    CT negative, patient remains at his baseline, son declines [facility] even with multiple recent falls, stable for discharge.

## 2025-06-09 NOTE — ED PROVIDER NOTE - PATIENT PORTAL LINK FT
You can access the FollowMyHealth Patient Portal offered by [facility] by registering at the following website: [email or web address]. By joining [employer]’s FollowMyHealth portal, you will also be able to view your health information using other applications (apps) compatible with our system.

## 2025-06-09 NOTE — ED PROVIDER NOTE - OBJECTIVE STATEMENT
54-year-old male history of dementia presents from [facility] after presumed unwitnessed fall.  Patient with baseline dementia, does not recall events.  Family at bedside states patient's SO went out to go shopping and found him on his knees in the kitchen.  Patient is currently without complaints, at baseline neurologic mental status.  No AC use

## 2025-06-09 NOTE — ED PROVIDER NOTE - PHYSICAL EXAMINATION
General: Awake, alert, lying in bed in NAD  HEENT: Normocephalic, atraumatic. No scleral icterus or conjunctival injection. EOMI. Moist mucous membranes. Oropharynx clear.   Neck:. Soft and supple.  Cardiac: RRR, Peripheral pulses 2+ and symmetric. No LE edema.  Resp: Lungs CTAB. No accessory muscle use  Abd: Soft, non-tender, non-distended. No guarding, rebound, or rigidity.  MSK: No chest wall tenderness. Pelvis stable. Extremities full ROM without pain. Compartments soft. Spine midline and non-tender.   Skin: No rashes, abrasions, or lacerations.  Neuro: AO x 2. Moves all extremities symmetrically. Motor strength and sensation grossly intact.

## 2025-06-10 ENCOUNTER — OFFICE (OUTPATIENT)
Dept: URBAN - METROPOLITAN AREA CLINIC 115 | Facility: CLINIC | Age: 85
Setting detail: OPHTHALMOLOGY
End: 2025-06-10
Payer: MEDICARE

## 2025-06-10 DIAGNOSIS — H02.822: ICD-10-CM

## 2025-06-10 DIAGNOSIS — H02.132: ICD-10-CM

## 2025-06-10 PROCEDURE — 99024 POSTOP FOLLOW-UP VISIT: CPT | Performed by: OPHTHALMOLOGY

## 2025-06-10 ASSESSMENT — REFRACTION_CURRENTRX
OS_ADD: +2.75
OD_ADD: +2.75
OS_VPRISM_DIRECTION: SV
OD_AXIS: 048
OS_VPRISM_DIRECTION: SV
OD_VPRISM_DIRECTION: SV
OD_CYLINDER: -0.25
OS_OVR_VA: 20/
OS_CYLINDER: -0.50
OS_SPHERE: +1.75
OS_SPHERE: +2.25
OS_AXIS: 003
OS_OVR_VA: 20/
OD_SPHERE: +2.50
OS_SPHERE: +2.25
OD_SPHERE: +1.75
OS_AXIS: 179
OD_OVR_VA: 20/
OD_CYLINDER: SPH
OD_AXIS: 0
OS_CYLINDER: -0.75
OD_OVR_VA: 20/
OD_VPRISM_DIRECTION: SV
OD_OVR_VA: 20/
OS_OVR_VA: 20/
OD_SPHERE: +2.50

## 2025-06-10 ASSESSMENT — REFRACTION_AUTOREFRACTION
OD_CYLINDER: -1.00
OS_AXIS: 079
OD_SPHERE: -0.25
OS_CYLINDER: -1.50
OS_SPHERE: +0.75
OD_AXIS: 105

## 2025-06-10 ASSESSMENT — REFRACTION_MANIFEST
OS_AXIS: 30
OS_CYLINDER: -0.50
OS_VA1: 20/20
OS_CYLINDER: -1.50
OD_CYLINDER: -0.50
OS_SPHERE: PLANO
OS_VA1: 20/40
OD_VA1: 20/20
OD_ADD: +2.50
OD_SPHERE: -0.75
OD_VA1: 20/30
OD_CYLINDER: -0.25
OS_ADD: +2.50
OD_SPHERE: PLANO
OS_SPHERE: PLANO
OS_AXIS: 071
OS_SPHERE: +0.50
OD_AXIS: 115
OD_VA1: 20/20
OD_AXIS: 121
OD_SPHERE: PLANO
OS_VA1: 20/20
OU_VA: 20/30

## 2025-06-10 ASSESSMENT — CONFRONTATIONAL VISUAL FIELD TEST (CVF)
OD_FINDINGS: FULL
OS_FINDINGS: FULL

## 2025-06-10 ASSESSMENT — VISUAL ACUITY
OD_BCVA: 20/30-1
OS_BCVA: 20/30-2

## 2025-06-10 ASSESSMENT — KERATOMETRY
OS_K2POWER_DIOPTERS: 44.25
OD_AXISANGLE_DEGREES: 034
METHOD_AUTO_MANUAL: AUTO
OS_K1POWER_DIOPTERS: 43.50
OD_K1POWER_DIOPTERS: 44.25
OS_AXISANGLE_DEGREES: 158
OD_K2POWER_DIOPTERS: 44.75

## 2025-06-10 ASSESSMENT — LID EXAM ASSESSMENTS
OD_BLEPHARITIS: RLL 2+
OS_BLEPHARITIS: LLL 2+

## 2025-07-22 ENCOUNTER — APPOINTMENT (OUTPATIENT)
Dept: DERMATOLOGY | Facility: CLINIC | Age: 85
End: 2025-07-22
Payer: MEDICARE

## 2025-07-22 ENCOUNTER — RESULT REVIEW (OUTPATIENT)
Age: 85
End: 2025-07-22

## 2025-07-22 PROCEDURE — 99213 OFFICE O/P EST LOW 20 MIN: CPT | Mod: 25

## 2025-07-22 PROCEDURE — 11102 TANGNTL BX SKIN SINGLE LES: CPT

## 2025-08-12 ENCOUNTER — OFFICE (OUTPATIENT)
Dept: URBAN - METROPOLITAN AREA CLINIC 115 | Facility: CLINIC | Age: 85
Setting detail: OPHTHALMOLOGY
End: 2025-08-12
Payer: MEDICARE

## 2025-08-12 DIAGNOSIS — H02.132: ICD-10-CM

## 2025-08-12 DIAGNOSIS — H02.822: ICD-10-CM

## 2025-08-12 PROCEDURE — 99212 OFFICE O/P EST SF 10 MIN: CPT | Performed by: OPHTHALMOLOGY

## 2025-08-12 ASSESSMENT — REFRACTION_MANIFEST
OS_SPHERE: PLANO
OS_VA1: 20/20
OS_ADD: +2.50
OD_CYLINDER: -0.50
OD_VA1: 20/30
OS_AXIS: 071
OD_VA1: 20/20
OS_CYLINDER: -1.50
OD_AXIS: 121
OS_CYLINDER: -0.50
OS_VA1: 20/20
OS_SPHERE: PLANO
OD_SPHERE: PLANO
OS_AXIS: 30
OD_VA1: 20/20
OD_CYLINDER: -0.25
OS_VA1: 20/40
OD_SPHERE: PLANO
OU_VA: 20/30
OD_SPHERE: -0.75
OD_AXIS: 115
OD_ADD: +2.50
OS_SPHERE: +0.50

## 2025-08-12 ASSESSMENT — PACHYMETRY
OS_CT_UM: 558
OS_CT_CORRECTION: -1

## 2025-08-12 ASSESSMENT — KERATOMETRY
OS_K1POWER_DIOPTERS: 43.50
OD_K1POWER_DIOPTERS: 44.25
OS_AXISANGLE_DEGREES: 158
OS_K2POWER_DIOPTERS: 44.25
OD_AXISANGLE_DEGREES: 034
METHOD_AUTO_MANUAL: AUTO
OD_K2POWER_DIOPTERS: 44.75

## 2025-08-12 ASSESSMENT — REFRACTION_CURRENTRX
OS_OVR_VA: 20/
OS_SPHERE: +2.25
OD_AXIS: 0
OD_SPHERE: +2.50
OD_SPHERE: +1.75
OD_OVR_VA: 20/
OD_SPHERE: +2.50
OD_AXIS: 048
OS_SPHERE: +1.75
OD_OVR_VA: 20/
OD_VPRISM_DIRECTION: SV
OS_AXIS: 179
OD_CYLINDER: -0.25
OS_SPHERE: +2.25
OS_ADD: +2.75
OD_ADD: +2.75
OS_VPRISM_DIRECTION: SV
OS_VPRISM_DIRECTION: SV
OD_OVR_VA: 20/
OS_CYLINDER: -0.75
OS_OVR_VA: 20/
OS_OVR_VA: 20/
OS_AXIS: 003
OD_VPRISM_DIRECTION: SV
OD_CYLINDER: SPH
OS_CYLINDER: -0.50

## 2025-08-12 ASSESSMENT — LID EXAM ASSESSMENTS
OS_BLEPHARITIS: LLL 2+
OD_BLEPHARITIS: RLL 2+

## 2025-08-12 ASSESSMENT — REFRACTION_AUTOREFRACTION
OS_SPHERE: -0.50
OD_SPHERE: -0.25
OS_AXIS: 051
OD_CYLINDER: -0.75
OD_AXIS: 109
OS_CYLINDER: -0.50

## 2025-08-12 ASSESSMENT — VISUAL ACUITY
OS_BCVA: 20/40-1
OD_BCVA: 20/30-1

## 2025-08-12 ASSESSMENT — CONFRONTATIONAL VISUAL FIELD TEST (CVF)
OS_FINDINGS: FULL
OD_FINDINGS: FULL

## 2025-08-12 ASSESSMENT — TONOMETRY
OS_IOP_MMHG: 15
OD_IOP_MMHG: 15

## 2025-09-08 ENCOUNTER — APPOINTMENT (OUTPATIENT)
Dept: DERMATOLOGY | Facility: CLINIC | Age: 85
End: 2025-09-08
Payer: MEDICARE

## 2025-09-08 PROCEDURE — 99213 OFFICE O/P EST LOW 20 MIN: CPT
